# Patient Record
Sex: MALE | Race: WHITE | Employment: OTHER | URBAN - NONMETROPOLITAN AREA
[De-identification: names, ages, dates, MRNs, and addresses within clinical notes are randomized per-mention and may not be internally consistent; named-entity substitution may affect disease eponyms.]

---

## 2023-01-14 ENCOUNTER — HOSPITAL ENCOUNTER (INPATIENT)
Age: 68
LOS: 4 days | Discharge: NURSING, REHAB OR LONG-TERM CARE | DRG: 896 | End: 2023-01-18
Attending: EMERGENCY MEDICINE | Admitting: INTERNAL MEDICINE
Payer: MEDICARE

## 2023-01-14 ENCOUNTER — APPOINTMENT (OUTPATIENT)
Dept: GENERAL RADIOLOGY | Age: 68
DRG: 896 | End: 2023-01-14
Payer: MEDICARE

## 2023-01-14 DIAGNOSIS — E46 PROTEIN-CALORIE MALNUTRITION, UNSPECIFIED SEVERITY (HCC): ICD-10-CM

## 2023-01-14 DIAGNOSIS — F10.10 ALCOHOL ABUSE: ICD-10-CM

## 2023-01-14 DIAGNOSIS — E87.20 METABOLIC ACIDOSIS: ICD-10-CM

## 2023-01-14 DIAGNOSIS — R62.7 FAILURE TO THRIVE IN ADULT: Primary | ICD-10-CM

## 2023-01-14 DIAGNOSIS — E87.1 HYPONATREMIA: ICD-10-CM

## 2023-01-14 LAB
ALBUMIN SERPL-MCNC: 3.5 G/DL (ref 3.5–5.1)
ALP BLD-CCNC: 154 U/L (ref 38–126)
ALT SERPL-CCNC: 88 U/L (ref 11–66)
AMMONIA: 17 UMOL/L (ref 11–60)
ANION GAP SERPL CALCULATED.3IONS-SCNC: 19 MEQ/L (ref 8–16)
APTT: 27.5 SECONDS (ref 22–38)
AST SERPL-CCNC: 93 U/L (ref 5–40)
BASOPHILS # BLD: 0.9 %
BASOPHILS ABSOLUTE: 0.1 THOU/MM3 (ref 0–0.1)
BILIRUB SERPL-MCNC: 0.4 MG/DL (ref 0.3–1.2)
BILIRUBIN DIRECT: < 0.2 MG/DL (ref 0–0.3)
BUN BLDV-MCNC: 25 MG/DL (ref 7–22)
CALCIUM SERPL-MCNC: 9.1 MG/DL (ref 8.5–10.5)
CHLORIDE BLD-SCNC: 93 MEQ/L (ref 98–111)
CHP ED QC CHECK: 107
CO2: 18 MEQ/L (ref 23–33)
CREAT SERPL-MCNC: 0.9 MG/DL (ref 0.4–1.2)
EKG ATRIAL RATE: 89 BPM
EKG P AXIS: 80 DEGREES
EKG P-R INTERVAL: 132 MS
EKG Q-T INTERVAL: 380 MS
EKG QRS DURATION: 80 MS
EKG QTC CALCULATION (BAZETT): 462 MS
EKG R AXIS: 62 DEGREES
EKG T AXIS: 76 DEGREES
EKG VENTRICULAR RATE: 89 BPM
EOSINOPHIL # BLD: 0.3 %
EOSINOPHILS ABSOLUTE: 0 THOU/MM3 (ref 0–0.4)
ERYTHROCYTE [DISTWIDTH] IN BLOOD BY AUTOMATED COUNT: 12.9 % (ref 11.5–14.5)
ERYTHROCYTE [DISTWIDTH] IN BLOOD BY AUTOMATED COUNT: 40.8 FL (ref 35–45)
ETHYL ALCOHOL, SERUM: < 0.01 %
GFR SERPL CREATININE-BSD FRML MDRD: > 60 ML/MIN/1.73M2
GLUCOSE BLD-MCNC: 107 MG/DL (ref 70–108)
GLUCOSE BLD-MCNC: 108 MG/DL (ref 70–108)
HCT VFR BLD CALC: 37.8 % (ref 42–52)
HEMOGLOBIN: 13.2 GM/DL (ref 14–18)
IMMATURE GRANS (ABS): 0.04 THOU/MM3 (ref 0–0.07)
IMMATURE GRANULOCYTES: 0.5 %
INFLUENZA A: NOT DETECTED
INFLUENZA B: NOT DETECTED
INR BLD: 0.97 (ref 0.85–1.13)
LACTIC ACID: 2.1 MMOL/L (ref 0.5–2)
LIPASE: 22.3 U/L (ref 5.6–51.3)
LYMPHOCYTES # BLD: 14.1 %
LYMPHOCYTES ABSOLUTE: 1.1 THOU/MM3 (ref 1–4.8)
MAGNESIUM: 2.1 MG/DL (ref 1.6–2.4)
MCH RBC QN AUTO: 30.3 PG (ref 26–33)
MCHC RBC AUTO-ENTMCNC: 34.9 GM/DL (ref 32.2–35.5)
MCV RBC AUTO: 86.9 FL (ref 80–94)
MONOCYTES # BLD: 7.7 %
MONOCYTES ABSOLUTE: 0.6 THOU/MM3 (ref 0.4–1.3)
NUCLEATED RED BLOOD CELLS: 0 /100 WBC
OSMOLALITY CALCULATION: 265.7 MOSMOL/KG (ref 275–300)
PLATELET # BLD: 314 THOU/MM3 (ref 130–400)
PMV BLD AUTO: 9.6 FL (ref 9.4–12.4)
POTASSIUM SERPL-SCNC: 3.7 MEQ/L (ref 3.5–5.2)
PREALBUMIN: 9.1 MG/DL (ref 20–40)
RBC # BLD: 4.35 MILL/MM3 (ref 4.7–6.1)
REASON FOR REJECTION: NORMAL
REJECTED TEST: NORMAL
SARS-COV-2 RNA, RT PCR: NOT DETECTED
SEG NEUTROPHILS: 76.5 %
SEGMENTED NEUTROPHILS ABSOLUTE COUNT: 5.7 THOU/MM3 (ref 1.8–7.7)
SODIUM BLD-SCNC: 130 MEQ/L (ref 135–145)
TOTAL CK: 17 U/L (ref 55–170)
TOTAL PROTEIN: 6.6 G/DL (ref 6.1–8)
TROPONIN T: < 0.01 NG/ML
TSH SERPL DL<=0.05 MIU/L-ACNC: 2.18 UIU/ML (ref 0.4–4.2)
WBC # BLD: 7.5 THOU/MM3 (ref 4.8–10.8)

## 2023-01-14 PROCEDURE — 82948 REAGENT STRIP/BLOOD GLUCOSE: CPT

## 2023-01-14 PROCEDURE — 84134 ASSAY OF PREALBUMIN: CPT

## 2023-01-14 PROCEDURE — 84484 ASSAY OF TROPONIN QUANT: CPT

## 2023-01-14 PROCEDURE — 96374 THER/PROPH/DIAG INJ IV PUSH: CPT

## 2023-01-14 PROCEDURE — 87636 SARSCOV2 & INF A&B AMP PRB: CPT

## 2023-01-14 PROCEDURE — 93005 ELECTROCARDIOGRAM TRACING: CPT | Performed by: EMERGENCY MEDICINE

## 2023-01-14 PROCEDURE — 93010 ELECTROCARDIOGRAM REPORT: CPT | Performed by: NUCLEAR MEDICINE

## 2023-01-14 PROCEDURE — 82077 ASSAY SPEC XCP UR&BREATH IA: CPT

## 2023-01-14 PROCEDURE — 84443 ASSAY THYROID STIM HORMONE: CPT

## 2023-01-14 PROCEDURE — 83690 ASSAY OF LIPASE: CPT

## 2023-01-14 PROCEDURE — 83605 ASSAY OF LACTIC ACID: CPT

## 2023-01-14 PROCEDURE — 83735 ASSAY OF MAGNESIUM: CPT

## 2023-01-14 PROCEDURE — 6360000002 HC RX W HCPCS: Performed by: STUDENT IN AN ORGANIZED HEALTH CARE EDUCATION/TRAINING PROGRAM

## 2023-01-14 PROCEDURE — 82550 ASSAY OF CK (CPK): CPT

## 2023-01-14 PROCEDURE — 85025 COMPLETE CBC W/AUTO DIFF WBC: CPT

## 2023-01-14 PROCEDURE — 99223 1ST HOSP IP/OBS HIGH 75: CPT | Performed by: PHYSICIAN ASSISTANT

## 2023-01-14 PROCEDURE — 87040 BLOOD CULTURE FOR BACTERIA: CPT

## 2023-01-14 PROCEDURE — 80053 COMPREHEN METABOLIC PANEL: CPT

## 2023-01-14 PROCEDURE — 99285 EMERGENCY DEPT VISIT HI MDM: CPT

## 2023-01-14 PROCEDURE — 36415 COLL VENOUS BLD VENIPUNCTURE: CPT

## 2023-01-14 PROCEDURE — 82248 BILIRUBIN DIRECT: CPT

## 2023-01-14 PROCEDURE — 82140 ASSAY OF AMMONIA: CPT

## 2023-01-14 PROCEDURE — 85730 THROMBOPLASTIN TIME PARTIAL: CPT

## 2023-01-14 PROCEDURE — 1200000003 HC TELEMETRY R&B

## 2023-01-14 PROCEDURE — 85610 PROTHROMBIN TIME: CPT

## 2023-01-14 PROCEDURE — 2580000003 HC RX 258: Performed by: STUDENT IN AN ORGANIZED HEALTH CARE EDUCATION/TRAINING PROGRAM

## 2023-01-14 PROCEDURE — 71045 X-RAY EXAM CHEST 1 VIEW: CPT

## 2023-01-14 RX ORDER — ONDANSETRON 4 MG/1
4 TABLET, ORALLY DISINTEGRATING ORAL EVERY 8 HOURS PRN
Status: DISCONTINUED | OUTPATIENT
Start: 2023-01-14 | End: 2023-01-18 | Stop reason: HOSPADM

## 2023-01-14 RX ORDER — THIAMINE HYDROCHLORIDE 100 MG/ML
100 INJECTION, SOLUTION INTRAMUSCULAR; INTRAVENOUS DAILY
Status: DISCONTINUED | OUTPATIENT
Start: 2023-01-14 | End: 2023-01-18 | Stop reason: HOSPADM

## 2023-01-14 RX ORDER — PHENOBARBITAL 32.4 MG/1
64.8 TABLET ORAL 2 TIMES DAILY
Status: DISCONTINUED | OUTPATIENT
Start: 2023-01-15 | End: 2023-01-15

## 2023-01-14 RX ORDER — ACETAMINOPHEN 650 MG/1
650 SUPPOSITORY RECTAL EVERY 6 HOURS PRN
Status: DISCONTINUED | OUTPATIENT
Start: 2023-01-14 | End: 2023-01-18 | Stop reason: HOSPADM

## 2023-01-14 RX ORDER — ACETAMINOPHEN 325 MG/1
650 TABLET ORAL EVERY 6 HOURS PRN
Status: DISCONTINUED | OUTPATIENT
Start: 2023-01-14 | End: 2023-01-18 | Stop reason: HOSPADM

## 2023-01-14 RX ORDER — SODIUM CHLORIDE 0.9 % (FLUSH) 0.9 %
5-40 SYRINGE (ML) INJECTION PRN
Status: DISCONTINUED | OUTPATIENT
Start: 2023-01-14 | End: 2023-01-18 | Stop reason: HOSPADM

## 2023-01-14 RX ORDER — ONDANSETRON 2 MG/ML
4 INJECTION INTRAMUSCULAR; INTRAVENOUS EVERY 6 HOURS PRN
Status: DISCONTINUED | OUTPATIENT
Start: 2023-01-14 | End: 2023-01-18 | Stop reason: HOSPADM

## 2023-01-14 RX ORDER — POTASSIUM CHLORIDE 7.45 MG/ML
10 INJECTION INTRAVENOUS PRN
Status: DISCONTINUED | OUTPATIENT
Start: 2023-01-14 | End: 2023-01-18 | Stop reason: HOSPADM

## 2023-01-14 RX ORDER — ENOXAPARIN SODIUM 100 MG/ML
40 INJECTION SUBCUTANEOUS DAILY
Status: DISCONTINUED | OUTPATIENT
Start: 2023-01-15 | End: 2023-01-15

## 2023-01-14 RX ORDER — MAGNESIUM SULFATE IN WATER 40 MG/ML
2000 INJECTION, SOLUTION INTRAVENOUS PRN
Status: DISCONTINUED | OUTPATIENT
Start: 2023-01-14 | End: 2023-01-18 | Stop reason: HOSPADM

## 2023-01-14 RX ORDER — POTASSIUM CHLORIDE 20 MEQ/1
40 TABLET, EXTENDED RELEASE ORAL PRN
Status: DISCONTINUED | OUTPATIENT
Start: 2023-01-14 | End: 2023-01-18 | Stop reason: HOSPADM

## 2023-01-14 RX ORDER — SODIUM CHLORIDE 9 MG/ML
INJECTION, SOLUTION INTRAVENOUS PRN
Status: DISCONTINUED | OUTPATIENT
Start: 2023-01-14 | End: 2023-01-18 | Stop reason: HOSPADM

## 2023-01-14 RX ORDER — PHENOBARBITAL 32.4 MG/1
32.4 TABLET ORAL 2 TIMES DAILY
Status: DISCONTINUED | OUTPATIENT
Start: 2023-01-16 | End: 2023-01-15

## 2023-01-14 RX ORDER — POLYETHYLENE GLYCOL 3350 17 G/17G
17 POWDER, FOR SOLUTION ORAL DAILY PRN
Status: DISCONTINUED | OUTPATIENT
Start: 2023-01-14 | End: 2023-01-18 | Stop reason: HOSPADM

## 2023-01-14 RX ORDER — SODIUM CHLORIDE 0.9 % (FLUSH) 0.9 %
5-40 SYRINGE (ML) INJECTION EVERY 12 HOURS SCHEDULED
Status: DISCONTINUED | OUTPATIENT
Start: 2023-01-14 | End: 2023-01-18 | Stop reason: HOSPADM

## 2023-01-14 RX ORDER — DEXTROSE AND SODIUM CHLORIDE 5; .9 G/100ML; G/100ML
INJECTION, SOLUTION INTRAVENOUS CONTINUOUS
Status: DISCONTINUED | OUTPATIENT
Start: 2023-01-14 | End: 2023-01-15

## 2023-01-14 RX ADMIN — THIAMINE HYDROCHLORIDE 100 MG: 100 INJECTION, SOLUTION INTRAMUSCULAR; INTRAVENOUS at 16:13

## 2023-01-14 RX ADMIN — DEXTROSE AND SODIUM CHLORIDE: 5; 900 INJECTION, SOLUTION INTRAVENOUS at 16:16

## 2023-01-14 ASSESSMENT — ENCOUNTER SYMPTOMS
ALLERGIC/IMMUNOLOGIC NEGATIVE: 1
RESPIRATORY NEGATIVE: 1
GASTROINTESTINAL NEGATIVE: 1
EYES NEGATIVE: 1

## 2023-01-14 NOTE — ED PROVIDER NOTES
PATIENT NAME: Makenzie Carlin  MRN: [id number]  : 1955  [place]: 2023    I performed a history and physical examination of the patient and discussed management with the Resident. I reviewed the Resident's note and agree with the documented findings and plan of care. Any areas of disagreement are noted on the chart. I was personally present for the key portions of any procedures and have documented in the chart those procedures where I was not present during the key portions. I have reviewed the emergency nurses triage note and agree with the chief complaint, past medical history, past surgical history, allergies, medications, social and family history as documented unless otherwise noted below. MEDICAL DEDISION MAKING (MDM)     Makenzie Carlin is a 74 y.o. male who presents to Emergency Department with Fatigue     Patient is brought in by EMS for evaluation of alcohol abuse, failure to thrive, poor oral intake, and significant weight loss. Patient is confused, history is obtained from his girlfriend who has been with him for the last year. According to his girlfriend, patient is alcoholic, drinking beer for years, patient has been losing weight over last 6-8 weeks. He only eats 2 or 3 bites every day. On arrival, patient was alert, but was slow in answering questions. He has no fever, no chills. He states he is not in pain. He has noticeable petechia from bilateral lower extremities. Physical exam: AVSS. Patient is skinny, ill-appearing, malnourished. Lungs are clear. Heart shows regular rhythmic rate, S1-S2. Abdomen soft. Extremities show no edema. Diffuse petechia from bilateral lower extremities. EKG has no acute changes. ED work-ups reveal severe malnutrition, metabolic acidosis, metabolic acidosis  and hyponatremia. Admission is warranted. Discussed and admitted to hospitalist service.     Vitals:    23 1449 23 1615 23 1728   BP: 121/85 (!) 128/96 121/74 Pulse: 94 93 90   Resp: 18 20 19   Temp: 97.5 °F (36.4 °C) 97.7 °F (36.5 °C) 98.2 °F (36.8 °C)   TempSrc: Oral Oral Oral   SpO2: 99% 99% 99%   Weight: 148 lb (67.1 kg)     Height: 5' 7\" (1.702 m)       Medications   thiamine (B-1) injection 100 mg (100 mg IntraVENous Given 1/14/23 1613)   dextrose 5 % and 0.9 % sodium chloride infusion ( IntraVENous New Bag 1/14/23 1616)     Labs Reviewed   CBC WITH AUTO DIFFERENTIAL - Abnormal; Notable for the following components:       Result Value    RBC 4.35 (*)     Hemoglobin 13.2 (*)     Hematocrit 37.8 (*)     All other components within normal limits   LACTIC ACID - Abnormal; Notable for the following components:    Lactic Acid 2.1 (*)     All other components within normal limits   PREALBUMIN - Abnormal; Notable for the following components:    Prealbumin 9.1 (*)     All other components within normal limits   CK - Abnormal; Notable for the following components: Total CK 17 (*)     All other components within normal limits   COMPREHENSIVE METABOLIC PANEL - Abnormal; Notable for the following components:    BUN 25 (*)     Sodium 130 (*)     Chloride 93 (*)     CO2 18 (*)     AST 93 (*)     Alkaline Phosphatase 154 (*)     ALT 88 (*)     All other components within normal limits   ANION GAP - Abnormal; Notable for the following components:    Anion Gap 19.0 (*)     All other components within normal limits   OSMOLALITY - Abnormal; Notable for the following components:    Osmolality Calc 265.7 (*)     All other components within normal limits   POCT GLUCOSE - Normal   COVID-19 & INFLUENZA COMBO   CULTURE, BLOOD 1   CULTURE, BLOOD 2   ETHANOL   BILIRUBIN, DIRECT   LIPASE   APTT   PROTIME-INR   MAGNESIUM   SPECIMEN REJECTION   AMMONIA   TROPONIN   TSH WITH REFLEX   GLOMERULAR FILTRATION RATE, ESTIMATED   URINE DRUG SCREEN   URINALYSIS   POCT GLUCOSE     XR CHEST PORTABLE   Final Result   1. Emphysematous changes predominantly within the upper lung zones are seen.  No other acute cardiopulmonary findings are otherwise demonstrated. **This report has been created using voice recognition software. It may contain minor errors which are inherent in voice recognition technology. **      Final report electronically signed by Dr. Savita Garcia on 1/14/2023 4:00 PM          FINAL IMPRESSION AND DISPOSITION      1. Failure to thrive in adult    2. Protein-calorie malnutrition, unspecified severity ([address]    3. Alcohol abuse    4. Metabolic acidosis    5. Hyponatremia        DISPOSITION Decision To Admit 01/14/2023 06:28:33 PM      PATIENT REFERRED TO:  No follow-up provider specified.     DISCHARGE MEDICATIONS:  New Prescriptions    No medications on file       (Please note that portions of this note were completed with a voice recognition program.  Efforts were made to edit the dictations but occasionally words aremis-transcribed.)    MD Aron Kelly MD  01/14/23 4810

## 2023-01-14 NOTE — ED TRIAGE NOTES
Pt arrives to from home with c/o increased fatigue and loss of appetite. EMT's state pt's friends called the squad with concern he has not eaten anything in a week, has been able to drink some water. Pt arrives oriented to his name and birthday, but is not oriented to the place, date, or time. Pt states he lives alone in [facility], has friends who check in on him regularly. On arrival pt states he has not had an appetite in a few days, does drink about 12 cans of beer a day. Pt denies any drug use. Pt is calm and cooperative on arrival, VSS.

## 2023-01-14 NOTE — ED PROVIDER NOTES
[facility]      EMERGENCY MEDICINE     Pt Name: Betty Gonzales  MRN: [id number]  [place] 1955  Date of evaluation: 1/14/2023  Provider: Alba Lynch MD    CHIEF COMPLAINT       Chief Complaint   Patient presents with    Fatigue       HISTORY OF PRESENT ILLNESS   Betty Gonzales is a pleasant 74 y.o. male who presents to the emergency department from from home, brought in by EMS for evaluation of weakness and loss of appetite. Patient admits to past medical history of COPD and alcohol abuse. Patient presents to the emergency department with confusion, fatigue and loss of appetite. He states he was brought in by his friends and has not had anything to eat \"for a while, and feeling really weak. \"  He admits to alcohol abuse drinking 78-94 alcoholic beverages daily, last alcoholic beverage was 3-4 days ago. Patient admits to prior hospitalization for alcohol withdrawals, currently denies symptoms of alcohol withdrawal.  Denies any symptoms of pain, shortness of breath, tremors, palpitations, headaches, anxiety, GI upset, headaches, diaphoresis. His friend Livan Zaidi and girlfriend Holly Malagon also present reporting changes in behavior, more aggressive, increased sleep, reporting he has not had a meal in at least 2 weeks only eating 2 or 3 bites per meal and losing weight over the last 6-8 weeks. Holly Malagon states she will cook him meals on a regular basis but he refuses to eat. Over the last couple days he has also stopped drinking alcohol due to poor appetite. Parent also states he has refused to go to the doctor in the past.    Admits to significant alcohol abuse: 07-18 tall alcoholic beverages daily. Daily smoker   Admits to marijuana use, declines other illicit substances      PASTMEDICAL HISTORY     History reviewed. No pertinent past medical history. Patient Active Problem List   Diagnosis Code    Syncope R55    Hyponatremia E87.1     SURGICAL HISTORY       History reviewed.  No pertinent surgical history. CURRENT MEDICATIONS       There are no discharge medications for this patient. ALLERGIES     has No Known Allergies. FAMILY HISTORY     has no family status information on file. SOCIAL HISTORY       Social History     Tobacco Use    Smoking status: Every Day     Packs/day: 0.50     Years: 40.00     Pack years: 20.00     Types: Cigarettes   Substance Use Topics    Alcohol use: Yes     Alcohol/week: 20.0 standard drinks     Types: 20 Cans of beer per week     Comment: social    Drug use: No       PHYSICAL EXAM       ED Triage Vitals [01/12/23 0956]   BP Temp Temp Source Heart Rate Resp SpO2 Height Weight   (!) 167/85 97.4 °F (36.3 °C) Oral 66 22 100 % 5' 11\" (1.803 m) 190 lb (86.2 kg)       Additional Vital Signs:  Vitals:    01/15/23 1144   BP: 92/67   Pulse: 75   Resp:    Temp: 97.5 °F (36.4 °C)   SpO2: 100%     Physical Exam  General appearance: Unkempt 62-year-old male. Alert but slow to answer questions. No apparent distress, cooperative. HEENT: Nystagmus. Pupils equal, round, and reactive to light. Conjunctivae/corneas clear. Neck: Supple, with full range of motion. No jugular venous distention. Trachea midline. Respiratory: Diffuse right-sided rhonchi and coarse breath sounds appreciated on auscultation. Left sided breath sounds clear to auscultation,   cardiovascular: Regular rate and rhythm with normal S1/S2 without murmurs, rubs or gallops. Abdomen: Soft, non-tender, non-distended with normal bowel sounds. Musculoskeletal: passive and active ROM x 4 extremities. Skin: Diffuse petechiae throughout his body, most notable on bilateral lower extremities. Neurologic:  Neurovascularly intact without any focal sensory/motor deficits. Cranial nerves: II-XII intact, grossly non-focal.  Psychiatric: Alert, slow to answer questions.   Capillary Refill: Brisk,< 3 seconds   Peripheral Pulses: +2 palpable, equal bilaterally     FORMAL DIAGNOSTIC RESULTS     RADIOLOGY: Interpretation per the Radiologist below, if available at the time of this note (none if blank):    XR CHEST PORTABLE   Final Result   1. Emphysematous changes predominantly within the upper lung zones are seen. No other acute cardiopulmonary findings are otherwise demonstrated. **This report has been created using voice recognition software. It may contain minor errors which are inherent in voice recognition technology. **      Final report electronically signed by Dr. Luly Argueta on 1/14/2023 4:00 PM      US ABDOMEN COMPLETE    (Results Pending)       LABS: (none if blank)  Labs Reviewed   CBC WITH AUTO DIFFERENTIAL - Abnormal; Notable for the following components:       Result Value    RBC 4.35 (*)     Hemoglobin 13.2 (*)     Hematocrit 37.8 (*)     All other components within normal limits   LACTIC ACID - Abnormal; Notable for the following components:    Lactic Acid 2.1 (*)     All other components within normal limits   PREALBUMIN - Abnormal; Notable for the following components:    Prealbumin 9.1 (*)     All other components within normal limits   CK - Abnormal; Notable for the following components:     Total CK 17 (*)     All other components within normal limits   COMPREHENSIVE METABOLIC PANEL - Abnormal; Notable for the following components:    BUN 25 (*)     Sodium 130 (*)     Chloride 93 (*)     CO2 18 (*)     AST 93 (*)     Alkaline Phosphatase 154 (*)     ALT 88 (*)     All other components within normal limits   ANION GAP - Abnormal; Notable for the following components:    Anion Gap 19.0 (*)     All other components within normal limits   OSMOLALITY - Abnormal; Notable for the following components:    Osmolality Calc 265.7 (*)     All other components within normal limits   BASIC METABOLIC PANEL W/ REFLEX TO MG FOR LOW K - Abnormal; Notable for the following components:    CO2 22 (*)     Glucose 120 (*)     All other components within normal limits   CBC WITH AUTO DIFFERENTIAL - Abnormal; Notable for the following components:    RBC 3.41 (*)     Hemoglobin 10.5 (*)     Hematocrit 30.9 (*)     All other components within normal limits   VITAMIN B12 & FOLATE - Abnormal; Notable for the following components:    Vitamin B-12 1553 (*)     All other components within normal limits   IRON BINDING CAPACITY - Abnormal; Notable for the following components:    TIBC 154 (*)     All other components within normal limits   IRON - Abnormal; Notable for the following components:    Iron 32 (*)     All other components within normal limits   FERRITIN - Abnormal; Notable for the following components:    Ferritin 530 (*)     All other components within normal limits   POCT GLUCOSE - Normal   COVID-19 & INFLUENZA COMBO   CULTURE, BLOOD 1   CULTURE, BLOOD 2   ETHANOL   BILIRUBIN, DIRECT   LIPASE   APTT   PROTIME-INR   MAGNESIUM   SPECIMEN REJECTION   AMMONIA   TROPONIN   TSH WITH REFLEX   GLOMERULAR FILTRATION RATE, ESTIMATED   LACTIC ACID   IRON SATURATION   LIPID PANEL   HEPATITIS PANEL, ACUTE   ANION GAP   GLOMERULAR FILTRATION RATE, ESTIMATED   URINE DRUG SCREEN   URINALYSIS   VITAMIN B1   POCT GLUCOSE       (Any cultures that may have been sent were not resulted at the time of this patient visit)    [facility] / ED COURSE:     1) Number and Complexity of Problems            Problem List This Visit:         Chief Complaint   Patient presents with    Fatigue              Differential Diagnosis includes (but not limited to): Failure to thrive, malnutrition, alcohol withdrawal, sepsis, pneumonia, hypoglycemia         2)  Data Reviewed (none if left blank)          My Independent interpretations:     EKG: Thiamine, folate, B12 normal sinus rhythm, ventricular rate 89, AR interval 132, QRS 80, , QTc 462    Imaging: Chest x-ray: Emphysematous changes, no effusion or opacities visualized. Labs:      Severe malnutrition with metabolic acidosis and hyponatremia.                  Decision Rules/Clinical Scores utilized:   NIH of 1: Missing the month          External Documentation Reviewed:         Previous patient encounter documents & history available on EMR was reviewed. See Formal Diagnostic Results above for the lab and radiology tests and orders. 3)  Treatment and Disposition          Shared Decision-Making was performed and disposition discussed with the        Patient/Family and questions answered. Social determinants of health impacting treatment or disposition: Alone, chronic alcoholic           Summary of Patient Presentation:    MDM  Patient presents to the emergency department via EMS called in by friends due to poor nutrition and alcohol abuse with pain appetite and weakness. Patient admits to drinking 37-70 alcoholic beverages daily and decreased appetite. Point-of-care glucose 107, lab work obtained, started patient on D5 normal saline and thiamine. Noticeable petechiae throughout his body most notable in bilateral lower extremities. Recommend admission to the inpatient service due to failure to thrive and malnutrition given patient's syndrome of weight loss, decreased appetite and poor nutrition and associated with inactivity. Patient admitted. Vitals Reviewed:    Vitals:    01/15/23 0026 01/15/23 0331 01/15/23 0812 01/15/23 1144   BP: 113/75 102/76 106/67 92/67   Pulse: 72 82 99 75   Resp: 16 16 16    Temp: 97.5 °F (36.4 °C) 98.4 °F (36.9 °C) 97 °F (36.1 °C) 97.5 °F (36.4 °C)   TempSrc: Oral Oral Oral Oral   SpO2: 99% 96% 98% 100%   Weight:       Height:           The patient was seen and examined. Appropriate diagnostic testing was performed and results reviewed with the patient. The results of pertinent diagnostic studies and exam findings were discussed. The patients provisional diagnosis and plan of care were discussed with the patient and present family who expressed understanding.  Any medications were reviewed and indications and risks of medications were discussed with the patient /family present. Strict verbal and written return precautions, instructions and appropriate follow-up provided to the patient.      ED Medications administered this visit:  (None if blank)  Medications   thiamine (B-1) injection 100 mg (100 mg IntraVENous Given 1/15/23 0833)   sodium chloride flush 0.9 % injection 5-40 mL (5 mLs IntraVENous Not Given 1/15/23 0832)   sodium chloride flush 0.9 % injection 5-40 mL (has no administration in time range)   0.9 % sodium chloride infusion (has no administration in time range)   ondansetron (ZOFRAN-ODT) disintegrating tablet 4 mg (has no administration in time range)     Or   ondansetron (ZOFRAN) injection 4 mg (has no administration in time range)   polyethylene glycol (GLYCOLAX) packet 17 g (has no administration in time range)   acetaminophen (TYLENOL) tablet 650 mg (has no administration in time range)     Or   acetaminophen (TYLENOL) suppository 650 mg (has no administration in time range)   potassium chloride (KLOR-CON M) extended release tablet 40 mEq (has no administration in time range)     Or   potassium bicarb-citric acid (EFFER-K) effervescent tablet 40 mEq (has no administration in time range)     Or   potassium chloride 10 mEq/100 mL IVPB (Peripheral Line) (has no administration in time range)   magnesium sulfate 2000 mg in 50 mL IVPB premix (has no administration in time range)   PHENobarbital (LUMINAL) 193.7 mg in sodium chloride 0.9 % 100 mL IVPB (0 mg IntraVENous Stopped 1/15/23 1048)     Followed by   PHENobarbital (LUMINAL) tablet 64.8 mg (has no administration in time range)     Followed by   PHENobarbital (LUMINAL) tablet 32.4 mg (has no administration in time range)   enoxaparin Sodium (LOVENOX) injection 30 mg (30 mg SubCUTAneous Given 1/15/23 0833)         PROCEDURES: (None if blank)  Procedures:     CRITICAL CARE: (None if blank)      DISCHARGE PRESCRIPTIONS: (None if blank)  There are no discharge medications for this patient. FINAL IMPRESSION      1. Failure to thrive in adult    2. Protein-calorie malnutrition, unspecified severity ([facility] [address]    3. Alcohol abuse    4. Metabolic acidosis    5. Hyponatremia          DISPOSITION/PLAN   DISPOSITION Admitted 01/14/2023 07:30:32 PM      OUTPATIENT FOLLOW UP THE PATIENT:  No follow-up provider specified.     MD Karen Sherman MD  Resident  01/15/23 0702

## 2023-01-14 NOTE — ED NOTES
Patient resting in bed. Respirations easy and unlabored. No distress noted. Call light within reach. Family at bedside  No new complaints at this time, fluids continue.       Liz Figueroa RN  01/14/23 1678

## 2023-01-15 ENCOUNTER — APPOINTMENT (OUTPATIENT)
Dept: ULTRASOUND IMAGING | Age: 68
DRG: 896 | End: 2023-01-15
Payer: MEDICARE

## 2023-01-15 LAB
ANION GAP SERPL CALCULATED.3IONS-SCNC: 13 MEQ/L (ref 8–16)
BASOPHILS # BLD: 0.9 %
BASOPHILS ABSOLUTE: 0.1 THOU/MM3 (ref 0–0.1)
BUN BLDV-MCNC: 19 MG/DL (ref 7–22)
CALCIUM SERPL-MCNC: 8.5 MG/DL (ref 8.5–10.5)
CHLORIDE BLD-SCNC: 102 MEQ/L (ref 98–111)
CHOLESTEROL, TOTAL: 165 MG/DL (ref 100–199)
CO2: 22 MEQ/L (ref 23–33)
CREAT SERPL-MCNC: 0.8 MG/DL (ref 0.4–1.2)
EOSINOPHIL # BLD: 1.5 %
EOSINOPHILS ABSOLUTE: 0.1 THOU/MM3 (ref 0–0.4)
ERYTHROCYTE [DISTWIDTH] IN BLOOD BY AUTOMATED COUNT: 13.1 % (ref 11.5–14.5)
ERYTHROCYTE [DISTWIDTH] IN BLOOD BY AUTOMATED COUNT: 42.6 FL (ref 35–45)
FERRITIN: 530 NG/ML (ref 22–322)
FOLATE: 10 NG/ML (ref 4.8–24.2)
GFR SERPL CREATININE-BSD FRML MDRD: > 60 ML/MIN/1.73M2
GLUCOSE BLD-MCNC: 120 MG/DL (ref 70–108)
HAV IGM SER IA-ACNC: NEGATIVE
HCT VFR BLD CALC: 30.9 % (ref 42–52)
HDLC SERPL-MCNC: 44 MG/DL
HEMOGLOBIN: 10.5 GM/DL (ref 14–18)
HEPATITIS B CORE IGM ANTIBODY: NEGATIVE
HEPATITIS B SURFACE ANTIGEN: NEGATIVE
HEPATITIS C ANTIBODY: NEGATIVE
IMMATURE GRANS (ABS): 0.04 THOU/MM3 (ref 0–0.07)
IMMATURE GRANULOCYTES: 0.6 %
IRON SATURATION: 21 % (ref 20–50)
IRON: 32 UG/DL (ref 65–195)
LACTIC ACID: 1.3 MMOL/L (ref 0.5–2)
LDL CHOLESTEROL CALCULATED: 108 MG/DL
LYMPHOCYTES # BLD: 18.3 %
LYMPHOCYTES ABSOLUTE: 1.2 THOU/MM3 (ref 1–4.8)
MCH RBC QN AUTO: 30.8 PG (ref 26–33)
MCHC RBC AUTO-ENTMCNC: 34 GM/DL (ref 32.2–35.5)
MCV RBC AUTO: 90.6 FL (ref 80–94)
MONOCYTES # BLD: 8.9 %
MONOCYTES ABSOLUTE: 0.6 THOU/MM3 (ref 0.4–1.3)
NUCLEATED RED BLOOD CELLS: 0 /100 WBC
PLATELET # BLD: 226 THOU/MM3 (ref 130–400)
PMV BLD AUTO: 10 FL (ref 9.4–12.4)
POTASSIUM REFLEX MAGNESIUM: 3.6 MEQ/L (ref 3.5–5.2)
RBC # BLD: 3.41 MILL/MM3 (ref 4.7–6.1)
SEG NEUTROPHILS: 69.8 %
SEGMENTED NEUTROPHILS ABSOLUTE COUNT: 4.5 THOU/MM3 (ref 1.8–7.7)
SODIUM BLD-SCNC: 137 MEQ/L (ref 135–145)
TOTAL IRON BINDING CAPACITY: 154 UG/DL (ref 171–450)
TRIGL SERPL-MCNC: 67 MG/DL (ref 0–199)
VITAMIN B-12: 1553 PG/ML (ref 211–911)
WBC # BLD: 6.5 THOU/MM3 (ref 4.8–10.8)

## 2023-01-15 PROCEDURE — 80074 ACUTE HEPATITIS PANEL: CPT

## 2023-01-15 PROCEDURE — 99233 SBSQ HOSP IP/OBS HIGH 50: CPT | Performed by: PHYSICIAN ASSISTANT

## 2023-01-15 PROCEDURE — 84425 ASSAY OF VITAMIN B-1: CPT

## 2023-01-15 PROCEDURE — 80048 BASIC METABOLIC PNL TOTAL CA: CPT

## 2023-01-15 PROCEDURE — 1200000003 HC TELEMETRY R&B

## 2023-01-15 PROCEDURE — 2580000003 HC RX 258: Performed by: PHYSICIAN ASSISTANT

## 2023-01-15 PROCEDURE — 6360000002 HC RX W HCPCS: Performed by: PHYSICIAN ASSISTANT

## 2023-01-15 PROCEDURE — 83540 ASSAY OF IRON: CPT

## 2023-01-15 PROCEDURE — 82746 ASSAY OF FOLIC ACID SERUM: CPT

## 2023-01-15 PROCEDURE — 6360000002 HC RX W HCPCS: Performed by: STUDENT IN AN ORGANIZED HEALTH CARE EDUCATION/TRAINING PROGRAM

## 2023-01-15 PROCEDURE — 36415 COLL VENOUS BLD VENIPUNCTURE: CPT

## 2023-01-15 PROCEDURE — 83605 ASSAY OF LACTIC ACID: CPT

## 2023-01-15 PROCEDURE — 83550 IRON BINDING TEST: CPT

## 2023-01-15 PROCEDURE — 85025 COMPLETE CBC W/AUTO DIFF WBC: CPT

## 2023-01-15 PROCEDURE — 80061 LIPID PANEL: CPT

## 2023-01-15 PROCEDURE — 6370000000 HC RX 637 (ALT 250 FOR IP): Performed by: PHYSICIAN ASSISTANT

## 2023-01-15 PROCEDURE — 82728 ASSAY OF FERRITIN: CPT

## 2023-01-15 PROCEDURE — 76700 US EXAM ABDOM COMPLETE: CPT

## 2023-01-15 PROCEDURE — 82607 VITAMIN B-12: CPT

## 2023-01-15 RX ORDER — PHENOBARBITAL 32.4 MG/1
32.4 TABLET ORAL 2 TIMES DAILY
Status: ACTIVE | OUTPATIENT
Start: 2023-01-16 | End: 2023-01-17

## 2023-01-15 RX ORDER — PHENOBARBITAL 32.4 MG/1
64.8 TABLET ORAL 2 TIMES DAILY
Status: DISPENSED | OUTPATIENT
Start: 2023-01-15 | End: 2023-01-16

## 2023-01-15 RX ORDER — ENOXAPARIN SODIUM 100 MG/ML
30 INJECTION SUBCUTANEOUS DAILY
Status: DISCONTINUED | OUTPATIENT
Start: 2023-01-15 | End: 2023-01-18 | Stop reason: HOSPADM

## 2023-01-15 RX ADMIN — DEXTROSE AND SODIUM CHLORIDE: 5; 900 INJECTION, SOLUTION INTRAVENOUS at 05:47

## 2023-01-15 RX ADMIN — THIAMINE HYDROCHLORIDE 100 MG: 100 INJECTION, SOLUTION INTRAMUSCULAR; INTRAVENOUS at 08:33

## 2023-01-15 RX ADMIN — PHENOBARBITAL 64.8 MG: 32.4 TABLET ORAL at 14:09

## 2023-01-15 RX ADMIN — ENOXAPARIN SODIUM 30 MG: 100 INJECTION SUBCUTANEOUS at 08:33

## 2023-01-15 RX ADMIN — SODIUM CHLORIDE, PRESERVATIVE FREE 10 ML: 5 INJECTION INTRAVENOUS at 23:57

## 2023-01-15 RX ADMIN — PHENOBARBITAL SODIUM 264.55 MG: 65 INJECTION INTRAMUSCULAR at 00:11

## 2023-01-15 RX ADMIN — PHENOBARBITAL SODIUM 193.7 MG: 65 INJECTION INTRAMUSCULAR at 08:00

## 2023-01-15 RX ADMIN — PHENOBARBITAL SODIUM 193.7 MG: 65 INJECTION INTRAMUSCULAR at 05:48

## 2023-01-15 NOTE — ED NOTES
ED to inpatient nurses report    Chief Complaint   Patient presents with    Fatigue      Present to ED from home  LOC: Name,   Vital signs   Vitals:    23 1449 23 1615 23 1728 23   BP: 121/85 (!) 128/96 121/74 113/76   Pulse: 94 93 90 89   Resp: 18  19 15   Temp: 97.5 °F (36.4 °C) 97.7 °F (36.5 °C) 98.2 °F (36.8 °C) 97.8 °F (36.6 °C)   TempSrc: Oral Oral Oral Oral   SpO2: 99% 99% 99% 97%   Weight: 148 lb (67.1 kg)      Height: 5' 7\" (1.702 m)         Oxygen Baseline     Current needs required RA   LDAs:   Peripheral IV 23 Right Wrist (Active)   Site Assessment Clean, dry & intact;Dry;Clean; Intact 23   Line Status Blood return noted;Brisk blood return;Flushed 23     Mobility: Requires assistance * 2  Pending ED orders: none  Present condition: stable    C-SSRS    Swallow Screening    Preferred Language: English     Electronically signed by Rip Cash RN on 2023 at 7:43 PM\       Rip Cash RN  23

## 2023-01-15 NOTE — H&P
Hospitalist - History & Physical      Patient: Syl Chan    Unit/Bed:35/035A  YOB: 1955  MRN: [id number]   Acct: [de-identified]   PCP: No primary care provider on file. Assessment and Plan:        Failure to thrive in adult:   D5/NS IV infusion  CXR - Emphysematous changes predominantly within the upper lung zones. Regular diet with nutritional supplementation  Urinalysis, Vit. B12, Folate level, total cholesterol  Consider Pulmonology consult and PFT for COPD evaluation  Hyponatremia:   D5/NS IV infusion  Recheck BMP   Alcohol Abuse: Thiamine injection  EKG - NSR  Drug panel  [facility] protocol  ***: ***    CC:  Fatigue    HPI: Patient is a 74 y.o. male with a history of alcohol abuse who presents to the ED with complaints of increased fatigue and loss of appetite. Patient's friends reports he has not eaten anything in a week, only a couple bites everyday, has been able to drink some water, normally drinks alcohol daily (12+ beer/day), but hasn't in 3-4 days. He has been losing weight over the last 6-8 weeks. Patient is oriented to his name and birthday, but is not oriented to the place, date, or time. Patient not meaningfully able to contribute to this HPI. Patient denies any drug use. Patient is being admitted for further evaluation and workup of FTT.  ROS: Review of Systems   Constitutional:  Positive for activity change, appetite change, fatigue and unexpected weight change. Negative for chills, diaphoresis and fever. HENT: Negative. Eyes: Negative. Respiratory: Negative. Cardiovascular: Negative. Gastrointestinal: Negative. Endocrine: Negative. Genitourinary: Negative. Musculoskeletal: Negative. Allergic/Immunologic: Negative. Neurological: Negative. Hematological: Negative. Psychiatric/Behavioral:  Positive for confusion and decreased concentration. Negative for hallucinations, self-injury and suicidal ideas. PMH:  History reviewed.  No pertinent past medical history. SHX:    Social History     Socioeconomic History    Marital status:      Spouse name: Not on file    Number of children: Not on file    Years of education: Not on file    Highest education level: Not on file   Occupational History    Not on file   Tobacco Use    Smoking status: Every Day     Packs/day: 0.50     Years: 40.00     Pack years: 20.00     Types: Cigarettes    Smokeless tobacco: Not on file   Substance and Sexual Activity    Alcohol use: Yes     Alcohol/week: 20.0 standard drinks     Types: 20 Cans of beer per week     Comment: social    Drug use: No    Sexual activity: Yes     Partners: Female   Other Topics Concern    Not on file   Social History Narrative    Not on file     Social Determinants of Health     Financial Resource Strain: Not on file   Food Insecurity: Not on file   Transportation Needs: Not on file   Physical Activity: Not on file   Stress: Not on file   Social Connections: Not on file   Intimate Partner Violence: Not on file   Housing Stability: Not on file     FHX:   Family History   Problem Relation Age of Onset    Arthritis Mother     High Blood Pressure Mother     Cancer Father      Allergies: No Known Allergies  Medications:     dextrose 5 % and 0.9 % NaCl 100 mL/hr at 01/14/23 1616      thiamine  100 mg IntraVENous Daily        No current facility-administered medications on file prior to encounter. No current outpatient medications on file prior to encounter.          Labs:   Recent Results (from the past 24 hour(s))   POCT Glucose    Collection Time: 01/14/23  3:05 PM   Result Value Ref Range    POC Glucose 107 70 - 108 mg/dl   POCT Glucose    Collection Time: 01/14/23  3:06 PM   Result Value Ref Range    QC OK? 107    CBC with Auto Differential    Collection Time: 01/14/23  3:10 PM   Result Value Ref Range    WBC 7.5 4.8 - 10.8 thou/mm3    RBC 4.35 (L) 4.70 - 6.10 mill/mm3    Hemoglobin 13.2 (L) 14.0 - 18.0 gm/dl    Hematocrit 37.8 (L) 42.0 - 52.0 %    MCV 86.9 80.0 - 94.0 fL    MCH 30.3 26.0 - 33.0 pg    MCHC 34.9 32.2 - 35.5 gm/dl    RDW-CV 12.9 11.5 - 14.5 %    RDW-SD 40.8 35.0 - 45.0 fL    Platelets 143 970 - 483 thou/mm3    MPV 9.6 9.4 - 12.4 fL    Seg Neutrophils 76.5 %    Lymphocytes 14.1 %    Monocytes 7.7 %    Eosinophils 0.3 %    Basophils 0.9 %    Immature Granulocytes 0.5 %    Segs Absolute 5.7 1.8 - 7.7 thou/mm3    Lymphocytes Absolute 1.1 1.0 - 4.8 thou/mm3    Monocytes Absolute 0.6 0.4 - 1.3 thou/mm3    Eosinophils Absolute 0.0 0.0 - 0.4 thou/mm3    Basophils Absolute 0.1 0.0 - 0.1 thou/mm3    Immature Grans (Abs) 0.04 0.00 - 0.07 thou/mm3    nRBC 0 /100 wbc   Ethanol    Collection Time: 01/14/23  3:10 PM   Result Value Ref Range    ETHYL ALCOHOL, SERUM < 0.01 0.00 %   Lactic Acid    Collection Time: 01/14/23  3:10 PM   Result Value Ref Range    Lactic Acid 2.1 (H) 0.5 - 2.0 mmol/L   EKG 12 Lead    Collection Time: 01/14/23  3:40 PM   Result Value Ref Range    Ventricular Rate 89 BPM    Atrial Rate 89 BPM    P-R Interval 132 ms    QRS Duration 80 ms    Q-T Interval 380 ms    QTc Calculation (Bazett) 462 ms    P Axis 80 degrees    R Axis 62 degrees    T Axis 76 degrees   SPECIMEN REJECTION    Collection Time: 01/14/23  4:04 PM   Result Value Ref Range    Rejected Test chems     Reason for Rejection see below    COVID-19 & Influenza Combo    Collection Time: 01/14/23  4:15 PM    Specimen: Nasopharyngeal Swab   Result Value Ref Range    SARS-CoV-2 RNA, RT PCR NOT DETECTED NOT DETECTED    INFLUENZA A NOT DETECTED NOT DETECTED    INFLUENZA B NOT DETECTED NOT DETECTED   Prealbumin    Collection Time: 01/14/23  4:29 PM   Result Value Ref Range    Prealbumin 9.1 (L) 20.0 - 40.0 mg/dl   Bilirubin, Direct    Collection Time: 01/14/23  4:29 PM   Result Value Ref Range    Bilirubin, Direct <0.2 0.0 - 0.3 mg/dL   Lipase    Collection Time: 01/14/23  4:29 PM   Result Value Ref Range    Lipase 22.3 5.6 - 51.3 U/L   APTT    Collection Time: 01/14/23  4:29 PM   Result Value Ref Range    aPTT 27.5 22.0 - 38.0 seconds   Protime-INR    Collection Time: 01/14/23  4:29 PM   Result Value Ref Range    INR 0.97 0.85 - 1.13   Magnesium    Collection Time: 01/14/23  4:29 PM   Result Value Ref Range    Magnesium 2.1 1.6 - 2.4 mg/dL   CK    Collection Time: 01/14/23  4:29 PM   Result Value Ref Range    Total CK 17 (L) 55 - 170 U/L   Comprehensive Metabolic Panel    Collection Time: 01/14/23  4:29 PM   Result Value Ref Range    Glucose 108 70 - 108 mg/dL    Creatinine 0.9 0.4 - 1.2 mg/dL    BUN 25 (H) 7 - 22 mg/dL    Sodium 130 (L) 135 - 145 meq/L    Potassium 3.7 3.5 - 5.2 meq/L    Chloride 93 (L) 98 - 111 meq/L    CO2 18 (L) 23 - 33 meq/L    Calcium 9.1 8.5 - 10.5 mg/dL    AST 93 (H) 5 - 40 U/L    Alkaline Phosphatase 154 (H) 38 - 126 U/L    Total Protein 6.6 6.1 - 8.0 g/dL    Albumin 3.5 3.5 - 5.1 g/dL    Total Bilirubin 0.4 0.3 - 1.2 mg/dL    ALT 88 (H) 11 - 66 U/L   Troponin    Collection Time: 01/14/23  4:29 PM   Result Value Ref Range    Troponin T < 0.010 ng/ml   TSH with Reflex    Collection Time: 01/14/23  4:29 PM   Result Value Ref Range    TSH 2.180 0.400 - 4.200 uIU/mL   Anion Gap    Collection Time: 01/14/23  4:29 PM   Result Value Ref Range    Anion Gap 19.0 (H) 8.0 - 16.0 meq/L   Glomerular Filtration Rate, Estimated    Collection Time: 01/14/23  4:29 PM   Result Value Ref Range    Est, Glom Filt Rate >60 >60 ml/min/1.73m2   Osmolality    Collection Time: 01/14/23  4:29 PM   Result Value Ref Range    Osmolality Calc 265.7 (L) 275.0 - 300.0 mOsmol/kg   Ammonia    Collection Time: 01/14/23  4:30 PM   Result Value Ref Range    Ammonia 17 11 - 60 umol/L         Vital Signs: T: 97.8 F P: 89 RR: 15 B/P: 113/76: FiO2: RA: O2 Sat: 97: I/O: No intake or output data in the 24 hours ending 01/14/23 1947      General:   Patient in no acute distress, resting comfortably in bed. He appears skinny, ill-appearing, malnourished. HEENT:  normocephalic and atraumatic. No scleral icterus. PEARLA, mucous membranes moist   Neck: supple. Trachea midline. No JVD. Full ROM, no meningismus. Lungs: clear to auscultation. No retractions, no accessory muscle use. Cardiac: RRR, no murmur, 2+ pulses  Abdomen: soft. Nontender. Bowel sounds present  Extremities:  No clubbing, cyanosis x 4, no edema    Vasculature: capillary refill < 3 seconds. Skin:  warm and dry. No visible rashes  Psych:  Alert and oriented x3. Affect appropriate  Lymph:  No supraclavicular adenopathy. Neurologic:  CN II-XII grossly intact. No focal deficit. Data: (All radiographs, tracings, PFTs, and imaging are personally viewed and interpreted unless otherwise noted).      EKG: rhythm: normal sinus rhythm, rate=89 bpm, ob=519 ms, qrs=80 ms, fy=143 ms, axis=80 degrees    ***    Electronically signed by  Suki Nathan

## 2023-01-15 NOTE — PLAN OF CARE
Problem: Discharge Planning  Goal: Discharge to home or other facility with appropriate resources  1/15/2023 1042 by Lisa Gamboa RN  Outcome: Progressing  Flowsheets (Taken 1/14/2023 2030 by Joan Maki RN)  Discharge to home or other facility with appropriate resources: Refer to discharge planning if patient needs post-hospital services based on physician order or complex needs related to functional status, cognitive ability or social support system  1/15/2023 1042 by Lisa Gamboa RN  Outcome: Progressing  Flowsheets (Taken 1/14/2023 2030 by Joan Maki RN)  Discharge to home or other facility with appropriate resources: Refer to discharge planning if patient needs post-hospital services based on physician order or complex needs related to functional status, cognitive ability or social support system     Problem: Safety - Adult  Goal: Free from fall injury  1/15/2023 1042 by Lisa Gamboa RN  Outcome: Progressing  1/15/2023 1042 by Lisa Gamboa RN  Outcome: Progressing  Flowsheets (Taken 1/15/2023 1042)  Free From Fall Injury: Instruct family/caregiver on patient safety     Problem: ABCDS Injury Assessment  Goal: Absence of physical injury  1/15/2023 1042 by Lisa Gamboa RN  Outcome: Progressing  1/15/2023 1042 by Lisa Gamboa RN  Outcome: Progressing  Flowsheets (Taken 1/15/2023 1042)  Absence of Physical Injury: Implement safety measures based on patient assessment   Care plan reviewed with patient. Patient verbalize understanding of the plan of care and contribute to goal setting.

## 2023-01-15 NOTE — PROGRESS NOTES
Virtual nurse attempted to complete admission with patient. Patient cannot tell me his  and is stating his  is different than what is listed. He cannot tell me his address or alternative contacts listed and does not recognize the names when asked. He says he is staying in a motel. Unable to proceed with admission at this time due to confusion. Reported to charge nurse.

## 2023-01-15 NOTE — PROGRESS NOTES
Pharmacist Review and Automatic Dose Adjustment of Prophylactic Enoxaparin    *Review reason for admission/hospital problem list*    The reviewing pharmacist has made an adjustment to the ordered enoxaparin dose or converted to UFH per the approved [facility] protocol and table as identified below. Mariya Franklin is a 74 y.o. male. Recent Labs     01/14/23  1629   CREATININE 0.9       Estimated Creatinine Clearance: 56 mL/min (based on SCr of 0.9 mg/dL). Recent Labs     01/14/23  1510   HGB 13.2*   HCT 37.8*        Recent Labs     01/14/23  1629   INR 0.97       Height:   Ht Readings from Last 1 Encounters:   01/15/23 5' 6\" (1.676 m)     Weight:  Wt Readings from Last 1 Encounters:   01/15/23 106 lb 14.4 oz (48.5 kg)               Plan: Based upon the patient's weight and renal function, the ordered enoxaparin dose of 40 mg daily has been changed/converted to 30 mg daily.       Thank you,  Corazon Melgar, [facility]  1/15/2023, 12:57 AM

## 2023-01-15 NOTE — H&P
Hospitalist - History & Physical      Patient: Jagdish Giraldo    Unit/Bed:-01/001-A  YOB: 1955  MRN: [id number]   Acct: [de-identified]   PCP: No primary care provider on file. Assessment and Plan:        Failure to thrive in adult:   D5/NS IV infusion  CXR - Emphysematous changes predominantly within the upper lung zones. Regular diet with nutritional supplementation  Urinalysis, Vit. B12, Folate level, total cholesterol  Consider Pulmonology consult and PFT for COPD evaluation  Hyponatremia:   D5/NS IV infusion  Recheck BMP   Alcohol Abuse: Thiamine injection  EKG - NSR  Drug panel  Phenobarbital prophylaxis protocol    CC:  Fatigue    HPI: Patient is a 74 y.o. male with a history of alcohol abuse who presents to the ED with complaints of increased fatigue and loss of appetite. Patient's friends reports he has not eaten anything in a week, only a couple bites everyday, has been able to drink some water, normally drinks alcohol daily (12+ beer/day), but hasn't in 3-4 days. He has been losing weight over the last 6-8 weeks. Patient is oriented to his name and birthday, but is not oriented to the place, date, or time. Patient not meaningfully able to contribute to this HPI. Patient denies any drug use. Patient is being admitted for further evaluation and workup of FTT and treatment of alcohol dependence. Patient is poor independent historian. ROS: Review of Systems   Constitutional:  Positive for activity change, appetite change, fatigue and unexpected weight change. Negative for chills, diaphoresis and fever. HENT: Negative. Eyes: Negative. Respiratory: Negative. Cardiovascular: Negative. Gastrointestinal: Negative. Endocrine: Negative. Genitourinary: Negative. Musculoskeletal: Negative. Allergic/Immunologic: Negative. Neurological: Negative. Hematological: Negative. Psychiatric/Behavioral:  Positive for confusion and decreased concentration. Negative for hallucinations, self-injury and suicidal ideas. PMH:  History reviewed. No pertinent past medical history. SHX:    Social History     Socioeconomic History    Marital status:      Spouse name: Not on file    Number of children: Not on file    Years of education: Not on file    Highest education level: Not on file   Occupational History    Not on file   Tobacco Use    Smoking status: Every Day     Packs/day: 0.50     Years: 40.00     Pack years: 20.00     Types: Cigarettes    Smokeless tobacco: Not on file   Substance and Sexual Activity    Alcohol use:  Yes     Alcohol/week: 20.0 standard drinks     Types: 20 Cans of beer per week     Comment: social    Drug use: No    Sexual activity: Yes     Partners: Female   Other Topics Concern    Not on file   Social History Narrative    Not on file     Social Determinants of Health     Financial Resource Strain: Not on file   Food Insecurity: Not on file   Transportation Needs: Not on file   Physical Activity: Not on file   Stress: Not on file   Social Connections: Not on file   Intimate Partner Violence: Not on file   Housing Stability: Not on file     FHX:   Family History   Problem Relation Age of Onset    Arthritis Mother     High Blood Pressure Mother     Cancer Father      Allergies: No Known Allergies  Medications:     dextrose 5 % and 0.9 % NaCl 100 mL/hr at 01/14/23 2218    sodium chloride        thiamine  100 mg IntraVENous Daily    sodium chloride flush  5-40 mL IntraVENous 2 times per day    [START ON 1/15/2023] enoxaparin  40 mg SubCUTAneous Daily    PHENobarbital (LUMINAL) IVPB  4 mg/kg (Ideal) IntraVENous Q4H    Followed by    Farrah Sorensen ON 1/15/2023] PHENobarbital  64.8 mg Oral BID    Followed by    Farrah Sorensen ON 1/16/2023] PHENobarbital  32.4 mg Oral BID     sodium chloride flush, 5-40 mL, PRN  sodium chloride, , PRN  ondansetron, 4 mg, Q8H PRN   Or  ondansetron, 4 mg, Q6H PRN  polyethylene glycol, 17 g, Daily PRN  acetaminophen, 650 mg, Q6H PRN   Or  acetaminophen, 650 mg, Q6H PRN  potassium chloride, 40 mEq, PRN   Or  potassium alternative oral replacement, 40 mEq, PRN   Or  potassium chloride, 10 mEq, PRN  magnesium sulfate, 2,000 mg, PRN  No current facility-administered medications on file prior to encounter. No current outpatient medications on file prior to encounter.          Labs:   Recent Results (from the past 24 hour(s))   POCT Glucose    Collection Time: 01/14/23  3:05 PM   Result Value Ref Range    POC Glucose 107 70 - 108 mg/dl   POCT Glucose    Collection Time: 01/14/23  3:06 PM   Result Value Ref Range    QC OK? 107    CBC with Auto Differential    Collection Time: 01/14/23  3:10 PM   Result Value Ref Range    WBC 7.5 4.8 - 10.8 thou/mm3    RBC 4.35 (L) 4.70 - 6.10 mill/mm3    Hemoglobin 13.2 (L) 14.0 - 18.0 gm/dl    Hematocrit 37.8 (L) 42.0 - 52.0 %    MCV 86.9 80.0 - 94.0 fL    MCH 30.3 26.0 - 33.0 pg    MCHC 34.9 32.2 - 35.5 gm/dl    RDW-CV 12.9 11.5 - 14.5 %    RDW-SD 40.8 35.0 - 45.0 fL    Platelets 394 800 - 397 thou/mm3    MPV 9.6 9.4 - 12.4 fL    Seg Neutrophils 76.5 %    Lymphocytes 14.1 %    Monocytes 7.7 %    Eosinophils 0.3 %    Basophils 0.9 %    Immature Granulocytes 0.5 %    Segs Absolute 5.7 1.8 - 7.7 thou/mm3    Lymphocytes Absolute 1.1 1.0 - 4.8 thou/mm3    Monocytes Absolute 0.6 0.4 - 1.3 thou/mm3    Eosinophils Absolute 0.0 0.0 - 0.4 thou/mm3    Basophils Absolute 0.1 0.0 - 0.1 thou/mm3    Immature Grans (Abs) 0.04 0.00 - 0.07 thou/mm3    nRBC 0 /100 wbc   Ethanol    Collection Time: 01/14/23  3:10 PM   Result Value Ref Range    ETHYL ALCOHOL, SERUM < 0.01 0.00 %   Lactic Acid    Collection Time: 01/14/23  3:10 PM   Result Value Ref Range    Lactic Acid 2.1 (H) 0.5 - 2.0 mmol/L   EKG 12 Lead    Collection Time: 01/14/23  3:40 PM   Result Value Ref Range    Ventricular Rate 89 BPM    Atrial Rate 89 BPM    P-R Interval 132 ms    QRS Duration 80 ms    Q-T Interval 380 ms    QTc Calculation (Bazett) 462 ms    P Axis 80 degrees    R Axis 62 degrees    T Axis 76 degrees   SPECIMEN REJECTION    Collection Time: 01/14/23  4:04 PM   Result Value Ref Range    Rejected Test chems     Reason for Rejection see below    COVID-19 & Influenza Combo    Collection Time: 01/14/23  4:15 PM    Specimen: Nasopharyngeal Swab   Result Value Ref Range    SARS-CoV-2 RNA, RT PCR NOT DETECTED NOT DETECTED    INFLUENZA A NOT DETECTED NOT DETECTED    INFLUENZA B NOT DETECTED NOT DETECTED   Prealbumin    Collection Time: 01/14/23  4:29 PM   Result Value Ref Range    Prealbumin 9.1 (L) 20.0 - 40.0 mg/dl   Bilirubin, Direct    Collection Time: 01/14/23  4:29 PM   Result Value Ref Range    Bilirubin, Direct <0.2 0.0 - 0.3 mg/dL   Lipase    Collection Time: 01/14/23  4:29 PM   Result Value Ref Range    Lipase 22.3 5.6 - 51.3 U/L   APTT    Collection Time: 01/14/23  4:29 PM   Result Value Ref Range    aPTT 27.5 22.0 - 38.0 seconds   Protime-INR    Collection Time: 01/14/23  4:29 PM   Result Value Ref Range    INR 0.97 0.85 - 1.13   Magnesium    Collection Time: 01/14/23  4:29 PM   Result Value Ref Range    Magnesium 2.1 1.6 - 2.4 mg/dL   CK    Collection Time: 01/14/23  4:29 PM   Result Value Ref Range    Total CK 17 (L) 55 - 170 U/L   Comprehensive Metabolic Panel    Collection Time: 01/14/23  4:29 PM   Result Value Ref Range    Glucose 108 70 - 108 mg/dL    Creatinine 0.9 0.4 - 1.2 mg/dL    BUN 25 (H) 7 - 22 mg/dL    Sodium 130 (L) 135 - 145 meq/L    Potassium 3.7 3.5 - 5.2 meq/L    Chloride 93 (L) 98 - 111 meq/L    CO2 18 (L) 23 - 33 meq/L    Calcium 9.1 8.5 - 10.5 mg/dL    AST 93 (H) 5 - 40 U/L    Alkaline Phosphatase 154 (H) 38 - 126 U/L    Total Protein 6.6 6.1 - 8.0 g/dL    Albumin 3.5 3.5 - 5.1 g/dL    Total Bilirubin 0.4 0.3 - 1.2 mg/dL    ALT 88 (H) 11 - 66 U/L   Troponin    Collection Time: 01/14/23  4:29 PM   Result Value Ref Range    Troponin T < 0.010 ng/ml   TSH with Reflex    Collection Time: 01/14/23  4:29 PM   Result Value Ref Range TSH 2.180 0.400 - 4.200 uIU/mL   Anion Gap    Collection Time: 01/14/23  4:29 PM   Result Value Ref Range    Anion Gap 19.0 (H) 8.0 - 16.0 meq/L   Glomerular Filtration Rate, Estimated    Collection Time: 01/14/23  4:29 PM   Result Value Ref Range    Est, Glom Filt Rate >60 >60 ml/min/1.73m2   Osmolality    Collection Time: 01/14/23  4:29 PM   Result Value Ref Range    Osmolality Calc 265.7 (L) 275.0 - 300.0 mOsmol/kg   Ammonia    Collection Time: 01/14/23  4:30 PM   Result Value Ref Range    Ammonia 17 11 - 60 umol/L         Vital Signs: T: 97.8 F P: 89 RR: 15 B/P: 113/76: FiO2: RA: O2 Sat: 97: I/O: No intake or output data in the 24 hours ending 01/14/23 2330      General:   Patient in no acute distress, resting comfortably in bed. He appears thin, unkempt, malnourished. HEENT:  normocephalic and atraumatic. No scleral icterus. PEARLA, mucous membranes moist   Neck: supple. Trachea midline. No JVD. Full ROM, no meningismus. Lungs: clear to auscultation. No retractions, no accessory muscle use. Cardiac: RRR, no murmur, 2+ pulses  Abdomen: soft. Nontender. Bowel sounds present  Extremities:  No clubbing, cyanosis x 4, no edema    Vasculature: capillary refill < 3 seconds. Skin:  warm and dry. No visible rashes  Psych:  Alert and oriented x3. Affect appropriate  Lymph:  No supraclavicular adenopathy. Neurologic:  CN II-XII grossly intact. No focal deficit. Data: (All radiographs, tracings, PFTs, and imaging are personally viewed and interpreted unless otherwise noted).      EKG: rhythm: normal sinus rhythm, rate=89 bpm, xf=918 ms, qrs=80 ms, yv=147 ms, axis=80 degrees        Electronically signed by  Maryana Hill PA-C

## 2023-01-15 NOTE — PROGRESS NOTES
Hospitalist Progress Note    Patient:  Aaliyah Varma      Unit/Bed:6K-01/001-A    YOB: 1955    MRN: [id number]       Acct: [de-identified]     PCP: No primary care provider on file. Date of Admission: 1/14/2023    Assessment/Plan:    Failure to thrive in adult:   CXR - Emphysematous changes predominantly within the upper lung zones. Urinalysis, Vit. B12, Folate level, total cholesterol, thiamine, iron studies ordered and pending   Consider Pulmonology consult and PFT for COPD evaluation  PT/ OT consulted  Dietician consulted   Hyponatremia- resolved  Suspected etiology beer potomania  130 on arrival, currently 137  D5/NS IV infusion stopped- continue NS   BMP daily  Alcohol Abuse: Thiamine injection- thiamine level pending  Ammonia normal, ETOH negative   EKG - NSR  Drug panel pending   Phenobarbital prophylaxis protocol  Consider addiction services if becomes more oriented  HAGMA- improved  CO2 18, AG 19 on arrival, Currently Co2 22, [place] normal   Lactic acid 2.1- repeat pending  Metabolic encephalopathy  Consider cognitive eval pending course  Continue with treatment as above  BMP daily   Transaminasemia  AST 93, ALT 88, Alk Phos 154  US Abdomen ordered   Lipase, bilirubin normal   Hepatitis panel ordered      Expected discharge date:  pending course    Disposition:    [] Home       [] TCU       [] Rehab       [] Psych       [] SNF       [] [facility]       [x] Unknown     Chief Complaint: confusion, weight loss     Hospital Course:   Initial HPI  \" Patient is a 74 y.o. male with a history of alcohol abuse who presents to the ED with complaints of increased fatigue and loss of appetite. Patient's friends reports he has not eaten anything in a week, only a couple bites everyday, has been able to drink some water, normally drinks alcohol daily (12+ beer/day), but hasn't in 3-4 days. He has been losing weight over the last 6-8 weeks.  Patient is oriented to his name and birthday, but is not oriented to the place, date, or time. Patient not meaningfully able to contribute to this HPI. Patient denies any drug use. Patient is being admitted for further evaluation and workup of FTT and treatment of alcohol dependence. Patient is poor independent historian. 1/15/2023  Patient able to tell me his name, . Is not oriented outside of this. Unable to answer any additional questions. Able to follow basic commands           Medications:  Reviewed    Infusion Medications    dextrose 5 % and 0.9 % NaCl 100 mL/hr at 01/15/23 0547    sodium chloride       Scheduled Medications    PHENobarbital (LUMINAL) IVPB  4 mg/kg IntraVENous Q4H    Followed by    PHENobarbital  64.8 mg Oral BID    Followed by    Finis Pacer ON 2023] PHENobarbital  32.4 mg Oral BID    enoxaparin  30 mg SubCUTAneous Daily    thiamine  100 mg IntraVENous Daily    sodium chloride flush  5-40 mL IntraVENous 2 times per day     PRN Meds: sodium chloride flush, sodium chloride, ondansetron **OR** ondansetron, polyethylene glycol, acetaminophen **OR** acetaminophen, potassium chloride **OR** potassium alternative oral replacement **OR** potassium chloride, magnesium sulfate      Intake/Output Summary (Last 24 hours) at 1/15/2023 0718  Last data filed at 1/15/2023 0026  Gross per 24 hour   Intake 0 ml   Output 0 ml   Net 0 ml       Diet:  ADULT DIET; Regular    Exam:  /76   Pulse 82   Temp 98.4 °F (36.9 °C) (Oral)   Resp 16   Ht 5' 6\" (1.676 m) Comment: pt stated his ht. Wt 106 lb 14.4 oz (48.5 kg)   SpO2 96%   BMI 17.25 kg/m²     General appearance: No apparent distress, appears stated age and cooperative. He appears thin, unkempt, malnourished. HEENT: Pupils equal, round, and reactive to light. Conjunctivae/corneas clear. Neck: Supple, with full range of motion. No jugular venous distention. Trachea midline. Respiratory:  Normal respiratory effort.  Clear to auscultation, bilaterally without Rales/Wheezes/Rhonchi. Cardiovascular: Regular rate and rhythm with normal S1/S2 without murmurs, rubs or gallops. Abdomen: Soft, non-tender, non-distended with normal bowel sounds. Musculoskeletal: passive and active ROM x 4 extremities. Skin: Skin color, texture, turgor normal.  No rashes or lesions. Neurologic:  Neurovascularly intact without any focal sensory/motor deficits. Cranial nerves: II-XII intact, grossly non-focal. No asterixis   Psychiatric: Alert and oriented to self only. Impaired insight   Capillary Refill: Brisk,< 3 seconds   Peripheral Pulses: +2 palpable, equal bilaterally       Labs:   Recent Labs     01/14/23  1510   WBC 7.5   HGB 13.2*   HCT 37.8*        Recent Labs     01/14/23  1629   *   K 3.7   CL 93*   CO2 18*   BUN 25*   CREATININE 0.9   CALCIUM 9.1     Recent Labs     01/14/23  1629   AST 93*   ALT 88*   BILIDIR <0.2   BILITOT 0.4   ALKPHOS 154*     Recent Labs     01/14/23  1629   INR 0.97     Recent Labs     01/14/23  1629   CKTOTAL 17*       Microbiology:      Urinalysis:      Lab Results   Component Value Date/Time    NITRU NEGATIVE 05/16/2013 08:30 PM    WBCUA 0-2 05/16/2013 08:30 PM    BACTERIA NONE 05/16/2013 08:30 PM    RBCUA 0-2 05/16/2013 08:30 PM    BLOODU NEGATIVE 05/16/2013 08:30 PM    SPECGRAV 1.010 05/16/2013 08:30 PM       Radiology:  XR CHEST PORTABLE    Result Date: 1/14/2023  PROCEDURE: XR CHEST PORTABLE CLINICAL INFORMATION: SOB COMPARISON: 5/16/2013 TECHNIQUE:  AP mobile chest single view  FINDINGS: Calcified lymph nodes are again seen at the right hilum. The heart size appears normal. There are suggested upper zone predominant emphysematous changes. No pleural effusion is seen. No focal consolidation is demonstrated. No acute osseous findings are seen. 1. Emphysematous changes predominantly within the upper lung zones are seen. No other acute cardiopulmonary findings are otherwise demonstrated.  **This report has been created using voice recognition software. It may contain minor errors which are inherent in voice recognition technology. ** Final report electronically signed by Dr. Galen Aguilera on 1/14/2023 4:00 PM      DVT prophylaxis: [x] Lovenox                                 [] SCDs                                 [] SQ Heparin                                 [] Encourage ambulation           [] Already on Anticoagulation     Code Status: Full Code    PT/OT Eval Status: ordered    Tele:   [] yes             [x] no    Active Hospital Problems    Diagnosis Date Noted    Hyponatremia [E87.1] 01/14/2023     Priority: Medium       Electronically signed by Zulay Good PA-C on 1/15/2023 at 7:18 AM

## 2023-01-16 LAB
ANION GAP SERPL CALCULATED.3IONS-SCNC: 16 MEQ/L (ref 8–16)
BUN BLDV-MCNC: 13 MG/DL (ref 7–22)
CALCIUM SERPL-MCNC: 9 MG/DL (ref 8.5–10.5)
CHLORIDE BLD-SCNC: 102 MEQ/L (ref 98–111)
CO2: 22 MEQ/L (ref 23–33)
CREAT SERPL-MCNC: 0.7 MG/DL (ref 0.4–1.2)
GFR SERPL CREATININE-BSD FRML MDRD: > 60 ML/MIN/1.73M2
GLUCOSE BLD-MCNC: 91 MG/DL (ref 70–108)
MAGNESIUM: 2.2 MG/DL (ref 1.6–2.4)
POTASSIUM REFLEX MAGNESIUM: 3.4 MEQ/L (ref 3.5–5.2)
SODIUM BLD-SCNC: 140 MEQ/L (ref 135–145)

## 2023-01-16 PROCEDURE — 6370000000 HC RX 637 (ALT 250 FOR IP): Performed by: PHYSICIAN ASSISTANT

## 2023-01-16 PROCEDURE — 6370000000 HC RX 637 (ALT 250 FOR IP)

## 2023-01-16 PROCEDURE — 97110 THERAPEUTIC EXERCISES: CPT

## 2023-01-16 PROCEDURE — 99232 SBSQ HOSP IP/OBS MODERATE 35: CPT | Performed by: PHYSICIAN ASSISTANT

## 2023-01-16 PROCEDURE — 80048 BASIC METABOLIC PNL TOTAL CA: CPT

## 2023-01-16 PROCEDURE — 97530 THERAPEUTIC ACTIVITIES: CPT

## 2023-01-16 PROCEDURE — 6360000002 HC RX W HCPCS: Performed by: STUDENT IN AN ORGANIZED HEALTH CARE EDUCATION/TRAINING PROGRAM

## 2023-01-16 PROCEDURE — 97162 PT EVAL MOD COMPLEX 30 MIN: CPT

## 2023-01-16 PROCEDURE — 36415 COLL VENOUS BLD VENIPUNCTURE: CPT

## 2023-01-16 PROCEDURE — 83735 ASSAY OF MAGNESIUM: CPT

## 2023-01-16 PROCEDURE — 97166 OT EVAL MOD COMPLEX 45 MIN: CPT

## 2023-01-16 PROCEDURE — 1200000003 HC TELEMETRY R&B

## 2023-01-16 PROCEDURE — 6360000002 HC RX W HCPCS: Performed by: PHYSICIAN ASSISTANT

## 2023-01-16 PROCEDURE — 92523 SPEECH SOUND LANG COMPREHEN: CPT

## 2023-01-16 RX ORDER — NICOTINE 21 MG/24HR
1 PATCH, TRANSDERMAL 24 HOURS TRANSDERMAL DAILY
Status: DISCONTINUED | OUTPATIENT
Start: 2023-01-16 | End: 2023-01-18 | Stop reason: HOSPADM

## 2023-01-16 RX ORDER — LANOLIN ALCOHOL/MO/W.PET/CERES
3 CREAM (GRAM) TOPICAL NIGHTLY PRN
Status: DISCONTINUED | OUTPATIENT
Start: 2023-01-16 | End: 2023-01-18 | Stop reason: HOSPADM

## 2023-01-16 RX ORDER — POTASSIUM CHLORIDE 20 MEQ/1
20 TABLET, EXTENDED RELEASE ORAL
Status: DISCONTINUED | OUTPATIENT
Start: 2023-01-16 | End: 2023-01-17

## 2023-01-16 RX ADMIN — ACETAMINOPHEN 650 MG: 325 TABLET ORAL at 23:23

## 2023-01-16 RX ADMIN — THIAMINE HYDROCHLORIDE 100 MG: 100 INJECTION, SOLUTION INTRAMUSCULAR; INTRAVENOUS at 11:24

## 2023-01-16 RX ADMIN — POTASSIUM CHLORIDE 20 MEQ: 1500 TABLET, EXTENDED RELEASE ORAL at 09:54

## 2023-01-16 RX ADMIN — Medication 3 MG: at 23:23

## 2023-01-16 RX ADMIN — ENOXAPARIN SODIUM 30 MG: 100 INJECTION SUBCUTANEOUS at 09:54

## 2023-01-16 ASSESSMENT — PAIN DESCRIPTION - LOCATION: LOCATION: GENERALIZED

## 2023-01-16 ASSESSMENT — PAIN DESCRIPTION - DESCRIPTORS: DESCRIPTORS: ACHING

## 2023-01-16 ASSESSMENT — PAIN SCALES - GENERAL: PAINLEVEL_OUTOF10: 4

## 2023-01-16 NOTE — PLAN OF CARE
Problem: Discharge Planning  Goal: Discharge to home or other facility with appropriate resources  1/16/2023 1307 by Srinivas Rivers RN  Outcome: Progressing  Flowsheets (Taken 1/16/2023 0031 by Shola Cooper RN)  Discharge to home or other facility with appropriate resources:   Identify barriers to discharge with patient and caregiver   Arrange for needed discharge resources and transportation as appropriate   Identify discharge learning needs (meds, wound care, etc)   Arrange for interpreters to assist at discharge as needed   Refer to discharge planning if patient needs post-hospital services based on physician order or complex needs related to functional status, cognitive ability or social support system  1/16/2023 0031 by Shola Cooper RN  Outcome: Progressing  Flowsheets (Taken 1/16/2023 0031)  Discharge to home or other facility with appropriate resources:   Identify barriers to discharge with patient and caregiver   Arrange for needed discharge resources and transportation as appropriate   Identify discharge learning needs (meds, wound care, etc)   Arrange for interpreters to assist at discharge as needed   Refer to discharge planning if patient needs post-hospital services based on physician order or complex needs related to functional status, cognitive ability or social support system     Problem: Safety - Adult  Goal: Free from fall injury  1/16/2023 1307 by Srinivas Rivers RN  Outcome: Progressing  Flowsheets (Taken 1/16/2023 1307)  Free From Fall Injury: Instruct family/caregiver on patient safety  1/16/2023 0031 by Shola Cooper RN  Outcome: Progressing  Flowsheets (Taken 1/16/2023 0031)  Free From Fall Injury:   Based on caregiver fall risk screen, instruct family/caregiver to ask for assistance with transferring infant if caregiver noted to have fall risk factors   Instruct family/caregiver on patient safety     Problem: ABCDS Injury Assessment  Goal: Absence of physical injury  1/16/2023 1307 by Anna Wayne RN  Outcome: Progressing  Flowsheets (Taken 1/15/2023 1042 by Aubrey Hill, RN)  Absence of Physical Injury: Implement safety measures based on patient assessment  1/16/2023 0031 by Shraddha Randhawa RN  Outcome: Progressing  Flowsheets (Taken 1/15/2023 1042 by Aubrey Hill, RN)  Absence of Physical Injury: Implement safety measures based on patient assessment     Problem: Chronic Conditions and Co-morbidities  Goal: Patient's chronic conditions and co-morbidity symptoms are monitored and maintained or improved  1/16/2023 1307 by Anna Wayne RN  Outcome: Progressing  Flowsheets (Taken 1/16/2023 0031 by Shraddha Randhawa RN)  Care Plan - Patient's Chronic Conditions and Co-Morbidity Symptoms are Monitored and Maintained or Improved:   Monitor and assess patient's chronic conditions and comorbid symptoms for stability, deterioration, or improvement   Collaborate with multidisciplinary team to address chronic and comorbid conditions and prevent exacerbation or deterioration   Update acute care plan with appropriate goals if chronic or comorbid symptoms are exacerbated and prevent overall improvement and discharge  1/16/2023 0031 by Shraddha Randhawa RN  Outcome: Progressing  Flowsheets (Taken 1/16/2023 0031)  Care Plan - Patient's Chronic Conditions and Co-Morbidity Symptoms are Monitored and Maintained or Improved:   Monitor and assess patient's chronic conditions and comorbid symptoms for stability, deterioration, or improvement   Collaborate with multidisciplinary team to address chronic and comorbid conditions and prevent exacerbation or deterioration   Update acute care plan with appropriate goals if chronic or comorbid symptoms are exacerbated and prevent overall improvement and discharge     Problem: Skin/Tissue Integrity  Goal: Absence of new skin breakdown  Description: 1. Monitor for areas of redness and/or skin breakdown  2. Assess vascular access sites hourly  3.   Every 4-6 hours minimum: Change oxygen saturation probe site  4. Every 4-6 hours:  If on nasal continuous positive airway pressure, respiratory therapy assess nares and determine need for appliance change or resting period.   1/16/2023 1307 by Charlotte Almeida RN  Outcome: Progressing  1/16/2023 0031 by Gaurav Ortiz RN  Outcome: Progressing  Note: Turn frequently      Problem: Respiratory - Adult  Goal: Achieves optimal ventilation and oxygenation  1/16/2023 1307 by Charlotte Almeida RN  Outcome: Progressing  [address] (Taken 1/16/2023 0031 by Gaurav Ortiz RN)  Achieves optimal ventilation and oxygenation:   Assess for changes in mentation and behavior   Assess for changes in respiratory status   Position to facilitate oxygenation and minimize respiratory effort   Oxygen supplementation based on oxygen saturation or arterial blood gases   Initiate smoking cessation protocol as indicated  1/16/2023 0031 by Gaurav Ortiz RN  Outcome: Progressing  Flowsheets (Taken 1/16/2023 0031)  Achieves optimal ventilation and oxygenation:   Assess for changes in mentation and behavior   Assess for changes in respiratory status   Position to facilitate oxygenation and minimize respiratory effort   Oxygen supplementation based on oxygen saturation or arterial blood gases   Initiate smoking cessation protocol as indicated     Problem: Skin/Tissue Integrity - Adult  Goal: Skin integrity remains intact  1/16/2023 1307 by Charlotte Almeida RN  Outcome: Progressing  Flowsheets (Taken 1/16/2023 0031 by Gaurav Ortzi RN)  Skin Integrity Remains Intact:   Monitor for areas of redness and/or skin breakdown   Assess vascular access sites hourly  1/16/2023 0031 by Gaurav Ortiz RN  Outcome: Progressing  Flowsheets (Taken 1/16/2023 0031)  Skin Integrity Remains Intact:   Monitor for areas of redness and/or skin breakdown   Assess vascular access sites hourly  Goal: Incisions, wounds, or drain sites healing without S/S of infection  1/16/2023 1307 by Charlotte Almeida RN  Outcome: Progressing  Flowsheets (Taken 1/16/2023 0031 by Ara Mccoy RN)  Incisions, Wounds, or Drain Sites Healing Without Sign and Symptoms of Infection: ADMISSION and DAILY: Assess and document risk factors for pressure ulcer development  1/16/2023 0031 by Ara Mccoy RN  Outcome: Progressing  Flowsheets (Taken 1/16/2023 0031)  Incisions, Wounds, or Drain Sites Healing Without Sign and Symptoms of Infection: ADMISSION and DAILY: Assess and document risk factors for pressure ulcer development  Goal: Oral mucous membranes remain intact  1/16/2023 1307 by Angie Martinez RN  Outcome: Progressing  Flowsheets (Taken 1/16/2023 1307)  Oral Mucous Membranes Remain Intact: Assess oral mucosa and hygiene practices  1/16/2023 0031 by Ara Mccoy RN  Outcome: Progressing     Problem: Metabolic/Fluid and Electrolytes - Adult  Goal: Electrolytes maintained within normal limits  1/16/2023 1307 by Angie Martinez RN  Outcome: Low [place] (Taken 1/16/2023 0031 by Ara Mccoy RN)  Electrolytes maintained within normal limits:   Administer electrolyte replacement as ordered   Monitor labs and assess patient for signs and symptoms of electrolyte imbalances   Monitor response to electrolyte replacements, including repeat lab results as appropriate  1/16/2023 0031 by Ara Mccoy RN  Outcome: Progressing  Flowsheets (Taken 1/16/2023 0031)  Electrolytes maintained within normal limits:   Administer electrolyte replacement as ordered   Monitor labs and assess patient for signs and symptoms of electrolyte imbalances   Monitor response to electrolyte replacements, including repeat lab results as appropriate  Goal: Hemodynamic stability and optimal renal function maintained  1/16/2023 1307 by Angie Martinez RN  Outcome: Progressing  Flowsheets (Taken 1/16/2023 0031 by Ara Mccoy RN)  Hemodynamic stability and optimal renal function maintained:   Encourage oral intake as appropriate   Monitor response to interventions for patient's volume status, including labs, urine output, blood pressure (other measures as available)   Monitor urine specific gravity, serum osmolarity and serum sodium as indicated or ordered   Monitor intake, output and patient weight   Monitor labs and assess for signs and symptoms of volume excess or deficit  1/16/2023 0031 by Aurora Owens RN  Outcome: Progressing  Flowsheets (Taken 1/16/2023 0031)  Hemodynamic stability and optimal renal function maintained:   Encourage oral intake as appropriate   Monitor response to interventions for patient's volume status, including labs, urine output, blood pressure (other measures as available)   Monitor urine specific gravity, serum osmolarity and serum sodium as indicated or ordered   Monitor intake, output and patient weight   Monitor labs and assess for signs and symptoms of volume excess or deficit  Goal: Glucose maintained within prescribed range  1/16/2023 1307 by Louann Don RN  Outcome: Progressing  Flowsheets (Taken 1/16/2023 0031 by Aurora Owens RN)  Glucose maintained within prescribed range:   Instruct patient on self management of diabetes and initiate consult as needed   Assess barriers to adequate nutritional intake and initiate nutrition consult as needed   Administer ordered medications to maintain glucose within target range   Assess for signs and symptoms of hyperglycemia and hypoglycemia   Monitor blood glucose as ordered  1/16/2023 0031 by Aurora Owens RN  Outcome: Progressing  Flowsheets (Taken 1/16/2023 0031)  Glucose maintained within prescribed range:   Instruct patient on self management of diabetes and initiate consult as needed   Assess barriers to adequate nutritional intake and initiate nutrition consult as needed   Administer ordered medications to maintain glucose within target range   Assess for signs and symptoms of hyperglycemia and hypoglycemia   Monitor blood glucose as ordered

## 2023-01-16 NOTE — PLAN OF CARE
Problem: Discharge Planning  Goal: Discharge to home or other facility with appropriate resources  1/16/2023 0031 by Aleah Herman RN  Outcome: Progressing  Flowsheets (Taken 1/16/2023 0031)  Discharge to home or other facility with appropriate resources:   Identify barriers to discharge with patient and caregiver   Arrange for needed discharge resources and transportation as appropriate   Identify discharge learning needs (meds, wound care, etc)   Arrange for interpreters to assist at discharge as needed   Refer to discharge planning if patient needs post-hospital services based on physician order or complex needs related to functional status, cognitive ability or social support system  1/15/2023 1042 by Brittney Chairez RN  Outcome: Progressing  [address] (Taken 1/14/2023 2030 by Reymundo Cox, RN)  Discharge to home or other facility with appropriate resources: Refer to discharge planning if patient needs post-hospital services based on physician order or complex needs related to functional status, cognitive ability or social support system  1/15/2023 1042 by Brittney Chairez RN  Outcome: Progressing  Flowsheets (Taken 1/14/2023 2030 by Reymundo Cox, RN)  Discharge to home or other facility with appropriate resources: Refer to discharge planning if patient needs post-hospital services based on physician order or complex needs related to functional status, cognitive ability or social support system     Problem: Safety - Adult  Goal: Free from fall injury  1/16/2023 0031 by Aleah Herman RN  Outcome: Progressing  Flowsheets (Taken 1/16/2023 0031)  Free From Fall Injury:   Based on caregiver fall risk screen, instruct family/caregiver to ask for assistance with transferring infant if caregiver noted to have fall risk factors   Instruct family/caregiver on patient safety  1/15/2023 1042 by Brittney Chairez RN  Outcome: Progressing  1/15/2023 1042 by Brittney Chairez RN  Outcome: Progressing  Flowsheets (Taken 1/15/2023 1042)  Free From Fall Injury: Instruct family/caregiver on patient safety     Problem: ABCDS Injury Assessment  Goal: Absence of physical injury  1/16/2023 0031 by Estrella Cramer RN  Outcome: Dipti Christiansen (Taken 1/15/2023 1042 by Joseluis Carcamo RN)  Absence of Physical Injury: Implement safety measures based on patient assessment  1/15/2023 1042 by Joseluis Carcamo RN  Outcome: Progressing  1/15/2023 1042 by Joseluis Carcamo RN  Outcome: Progressing  Flowsheets (Taken 1/15/2023 1042)  Absence of Physical Injury: Implement safety measures based on patient assessment     Problem: Skin/Tissue Integrity  Goal: Absence of new skin breakdown  Description: 1. Monitor for areas of redness and/or skin breakdown  2. Assess vascular access sites hourly  3. Every 4-6 hours minimum:  Change oxygen saturation probe site  4. Every 4-6 hours:  If on nasal continuous positive airway pressure, respiratory therapy assess nares and determine need for appliance change or resting period.   Outcome: Progressing  Note: Turn frequently      Problem: Skin/Tissue Integrity - Adult  Goal: Skin integrity remains intact  Outcome: Progressing  Flowsheets (Taken 1/16/2023 0031)  Skin Integrity Remains Intact:   Monitor for areas of redness and/or skin breakdown   Assess vascular access sites hourly  Goal: Incisions, wounds, or drain sites healing without S/S of infection  Outcome: Progressing  Flowsheets (Taken 1/16/2023 0031)  Incisions, Wounds, or Drain Sites Healing Without Sign and Symptoms of Infection: ADMISSION and DAILY: Assess and document risk factors for pressure ulcer development  Goal: Oral mucous membranes remain intact  Outcome: Progressing     Problem: Metabolic/Fluid and Electrolytes - Adult  Goal: Electrolytes maintained within normal limits  Outcome: Progressing  Flowsheets (Taken 1/16/2023 0031)  Electrolytes maintained within normal limits:   Administer electrolyte replacement as ordered   Monitor labs and assess patient for signs and symptoms of electrolyte imbalances   Monitor response to electrolyte replacements, including repeat lab results as appropriate  Goal: Hemodynamic stability and optimal renal function maintained  Outcome: Progressing  Flowsheets (Taken 1/16/2023 0031)  Hemodynamic stability and optimal renal function maintained:   Encourage oral intake as appropriate   Monitor response to interventions for patient's volume status, including labs, urine output, blood pressure (other measures as available)   Monitor urine specific gravity, serum osmolarity and serum sodium as indicated or ordered   Monitor intake, output and patient weight   Monitor labs and assess for signs and symptoms of volume excess or deficit  Goal: Glucose maintained within prescribed range  Outcome: Progressing  Flowsheets (Taken 1/16/2023 0031)  Glucose maintained within prescribed range:   Instruct patient on self management of diabetes and initiate consult as needed   Assess barriers to adequate nutritional intake and initiate nutrition consult as needed   Administer ordered medications to maintain glucose within target range   Assess for signs and symptoms of hyperglycemia and hypoglycemia   Monitor blood glucose as ordered

## 2023-01-16 NOTE — PROGRESS NOTES
01/16/23 1603   Encounter Summary   Encounter Overview/Reason  Initial Encounter; Attempted Encounter   Service Provided For: Patient not available   Support System Unknown   Begin Time 1525   End Time  1530   Total Time Calculated 5 min   Assessment/Intervention/Outcome   Assessment Calm; Unable to assess   Intervention Sustaining Presence/Ministry of presence   Outcome Acceptance   Plan and Referrals   Plan/Referrals Continue to visit, (comment)     ASSESSMENT  P is 82-year-old male diagnosed with hyponatremia. The room was open. As I entered, I noticed he was sleeping. The patient was unavailable.

## 2023-01-16 NOTE — PROGRESS NOTES
This nurse in room to administrated patient's HS medication. Pt has scheduled Phenobarbital. Pt has to have physical stimulation in order to be awaken. This nurse will contact doctor to update on patient's condition. 1/15/23 10:30 PM  [phone] From: Francisca Franklin rn [facility] 6A RE: Ida Adelamichaela Pt has daren. Phenobarbital for 2100. Pt needs physical stimulation to be awaken. Pt's blood pressure is 101/70. Is it okay to hold the 2100 dose? Read 10:30 PM   1/15/23 10:30 PM   Yeah let's hold     1/15/23 10:31 PM  thank you, I will cont to monitor patient and let you know of any changes.   Read 10:32 PM     1/15/23 10:32 PM   Sounds good

## 2023-01-16 NOTE — PROGRESS NOTES
Hospitalist Progress Note    Patient:  Chris Plaza      Unit/Bed:6K-01/001-A    YOB: 1955    MRN: [id number]       Acct: [de-identified]     PCP: No primary care provider on file. Date of Admission: 1/14/2023    Assessment/Plan:    Failure to thrive in adult:   CXR - Emphysematous changes predominantly within the upper lung zones. Urinalysis pending   Vit. B12, Folate level normal,   thiamine, iron studies ordered and pending   Consider Pulmonology consult and PFT for COPD evaluation- recommend outpatient as patient currently asymptomatic and not requiring any supplemental O2   PT/ OT consulted- recommend SNF and 24 hour supervision  Dietician consulted   Triglycerides and prealbumin low consistent with malnutrition  Hypokalemia  Mild at 3.4  Start Kcl 20meq daily   BMP daily   Hyponatremia- resolved  Suspected etiology beer potomania  130 on arrival, currently 140  D5/NS IV infusion stopped- continue NS   BMP daily  Alcohol Abuse: Thiamine injection- thiamine level pending  Ammonia normal, ETOH negative   EKG - NSR   Phenobarbital prophylaxis protocol    HAGMA- improved  CO2 18, AG 19 on arrival, Currently Co2 22, Gap normal   Lactic acid 2.1 initially- normal now   Tobacco Abuse  According to daughters, 3 packs per day chronically  Nicotine patch ordered.    Metabolic encephalopathy secondary to chronic alcohol abuse and starvation - improving  Cognitive evaluation ordered  Continue with treatment as above  BMP daily   Transaminasemia secondary to Alcohol Fatty Liver Disease  AST 93, ALT 88, Alk Phos 154  US Abdomen demonstrating fatty liver   Lipase, bilirubin normal   Hepatitis panel negative      Expected discharge date:  pending course    Disposition:    [] Home       [] TCU       [] Rehab       [] Psych       [] SNF       [] [facility]       [x] Unknown     Chief Complaint: confusion, weight loss     Hospital Course:   Initial HPI  \" Patient is a 74 y.o. male with a history of alcohol abuse who presents to the ED with complaints of increased fatigue and loss of appetite. Patient's friends reports he has not eaten anything in a week, only a couple bites everyday, has been able to drink some water, normally drinks alcohol daily (12+ beer/day), but hasn't in 3-4 days. He has been losing weight over the last 6-8 weeks. Patient is oriented to his name and birthday, but is not oriented to the place, date, or time. Patient not meaningfully able to contribute to this HPI. Patient denies any drug use. Patient is being admitted for further evaluation and workup of FTT and treatment of alcohol dependence. Patient is poor independent historian. 1/15/2023  Patient able to tell me his name, . Is not oriented outside of this. Unable to answer any additional questions. Able to follow basic commands     2023  Patient is more alert today- orientation is still impaired  Daughters are at bedside  BP's are soft- Bolus of fluids ordered to allow for more room for phenobarb protocol. PT recommending SNF - Daughters agree       Medications:  Reviewed    Infusion Medications    sodium chloride       Scheduled Medications    PHENobarbital  64.8 mg Oral BID    Followed by    PHENobarbital  32.4 mg Oral BID    enoxaparin  30 mg SubCUTAneous Daily    thiamine  100 mg IntraVENous Daily    sodium chloride flush  5-40 mL IntraVENous 2 times per day     PRN Meds: sodium chloride flush, sodium chloride, ondansetron **OR** ondansetron, polyethylene glycol, acetaminophen **OR** acetaminophen, potassium chloride **OR** potassium alternative oral replacement **OR** potassium chloride, magnesium sulfate      Intake/Output Summary (Last 24 hours) at 2023 0733  Last data filed at 1/15/2023 1455  Gross per 24 hour   Intake 240 ml   Output --   Net 240 ml         Diet:  ADULT DIET;  Regular    Exam:  BP 94/71   Pulse 71   Temp 97.9 °F (36.6 °C) (Oral)   Resp 16   Ht 5' 6\" (1.676 m) Comment: pt stated his ht. Wt 106 lb 14.4 oz (48.5 kg)   SpO2 100%   BMI 17.25 kg/m²     General appearance: No apparent distress, appears stated age and cooperative. He appears thin, unkempt, malnourished. HEENT: Pupils equal, round, and reactive to light. Conjunctivae/corneas clear. Neck: Supple, with full range of motion. No jugular venous distention. Trachea midline. Respiratory:  Normal respiratory effort. Clear to auscultation, bilaterally without Rales/Wheezes/Rhonchi. Cardiovascular: Regular rate and rhythm with normal S1/S2 without murmurs, rubs or gallops. Abdomen: Soft, non-tender, non-distended with normal bowel sounds. Musculoskeletal: passive and active ROM x 4 extremities. Skin: Skin color, texture, turgor normal.  No rashes or lesions. Neurologic:  Neurovascularly intact without any focal sensory/motor deficits. Cranial nerves: II-XII intact, grossly non-focal. No asterixis   Psychiatric: Alert and oriented to self only.  Impaired insight   Capillary Refill: Brisk,< 3 seconds   Peripheral Pulses: +2 palpable, equal bilaterally       Labs:   Recent Labs     01/14/23  1510 01/15/23  0704   WBC 7.5 6.5   HGB 13.2* 10.5*   HCT 37.8* 30.9*    226       Recent Labs     01/14/23  1629 01/15/23  0704   * 137   K 3.7 3.6   CL 93* 102   CO2 18* 22*   BUN 25* 19   CREATININE 0.9 0.8   CALCIUM 9.1 8.5       Recent Labs     01/14/23  1629   AST 93*   ALT 88*   BILIDIR <0.2   BILITOT 0.4   ALKPHOS 154*       Recent Labs     01/14/23  1629   INR 0.97       Recent Labs     01/14/23  1629   CKTOTAL 17*         Microbiology:      Urinalysis:      Lab Results   Component Value Date/Time    NITRU NEGATIVE 05/16/2013 08:30 PM    WBCUA 0-2 05/16/2013 08:30 PM    BACTERIA NONE 05/16/2013 08:30 PM    RBCUA 0-2 05/16/2013 08:30 PM    BLOODU NEGATIVE 05/16/2013 08:30 PM    SPECGRAV 1.010 05/16/2013 08:30 PM       Radiology:  XR CHEST PORTABLE    Result Date: 1/14/2023  PROCEDURE: XR CHEST PORTABLE CLINICAL INFORMATION: SOB COMPARISON: 5/16/2013 TECHNIQUE:  AP mobile chest single view  FINDINGS: Calcified lymph nodes are again seen at the right hilum. The heart size appears normal. There are suggested upper zone predominant emphysematous changes. No pleural effusion is seen. No focal consolidation is demonstrated. No acute osseous findings are seen. 1. Emphysematous changes predominantly within the upper lung zones are seen. No other acute cardiopulmonary findings are otherwise demonstrated. **This report has been created using voice recognition software. It may contain minor errors which are inherent in voice recognition technology. ** Final report electronically signed by Dr. Christelle Vaughan on 1/14/2023 4:00 PM      DVT prophylaxis: [x] Lovenox                                 [] SCDs                                 [] SQ Heparin                                 [] Encourage ambulation           [] Already on Anticoagulation     Code Status: Full Code    PT/OT Eval Status: ordered    Tele:   [] yes             [x] no    Active Hospital Problems    Diagnosis Date Noted    Hyponatremia [E87.1] 01/14/2023     Priority: Medium       Electronically signed by Melvin Ospina PA-C on 1/16/2023 at 7:33 AM

## 2023-01-16 NOTE — PROGRESS NOTES
[facility]  SPEECH THERAPY  STRZ RENAL TELEMETRY 6K  Speech - Language - Cognitive Evaluation    SLP Individual Minutes  Time In: 1345  Time Out: 1407  Minutes: 22  Timed Code Treatment Minutes: 0 Minutes       Date: 2023  Patient Name: Flako Merrill      CSN: [id number]   : 1955  (67 y.o.)  Gender: male   Referring Physician:  Brooke Mckeon PA-C  Diagnosis: Alcohol Abuse  Precautions: Fall risk  History of Present Illness/Injury: Patient admitted to [facility] with above medical dx.  Per chart review, \"Patient is a 67 y.o. male with a history of alcohol abuse who presents to the ED with complaints of increased fatigue and loss of appetite. Patient's friends reports he has not eaten anything in a week, only a couple bites everyday, has been able to drink some water, normally drinks alcohol daily (12+ beer/day), but hasn't in 3-4 days. He has been losing weight over the last 6-8 weeks. Patient is oriented to his name and birthday, but is not oriented to the place, date, or time. Patient not meaningfully able to contribute to this HPI. Patient denies any drug use. \"    ST consulted to complete cognition evaluation to establish POC.    History reviewed. No pertinent past medical history.    Pain: No pain reported.    Subjective:  RN with approval to proceed with evaluation.  Patient resting in bed upon ST arrival.  Patient required increased encouragement to complete assessment.  Patient removed IV, RN informed and cleaned patient up and bandaged arm.    SOCIAL HISTORY:   Living Arrangements: By self  Work History:  Working  Driving Status: Active   Finance Management: Independent  Medication Management: Independent  ADL's: Independent.   Vision Status: Glasses  Hearing: WFL  Type of Home: House  Home Layout: Two level, Bed/Bath upstairs  Home Access:  (flight of steps to 2nd floor)  Home Equipment: Walker, rolling, Cane, Grab bars    SPEECH / VOICE:  Speech and Voice appear to be  grossly intact for basic and complex daily communication    LANGUAGE:  Receptive:  1 Step Commands: 3/3  2 Step Commands: 1/3  Simple Yes/No Questions: 2/3    Expressive:  Automatic Speech: 1/2  Divergent Naming (abstract): 0 items named    COGNITION:  Jean Cognitive Assessment (MOCA) version 7.1 completed. Patient scored 3/25. Normal is greater than or equal to 26/30. Orientation: 0/6  Immediate Recall: 4/5  Short-Term Recall: 0/5  Divergent Namin items named in given 1 minute time frame  Reasonin/2  Sequencin/2  Thought Organization: Impaired  Insight: Impaired  Attention: Impaired  Math Computation: 0/3    SWALLOWING:  Current Diet: Regular diet and thin lioquids  Not Tested         RECOMMENDATIONS/ASSESSMENT:  DIAGNOSTIC IMPRESSIONS:  Patient presents with severe cognitive deficits evidenced by difficulties with immediate and delayed recall, sequencing, attention, reasoning, divergent naming, and orientation. Patient reported independence with ADLS and IADLS prior to hospital stay. ST recommends patient receive skilled services to address deficits mentioned above to permit safe return to ADLS, IADLS, and driving. Rehabilitation Potential: fair  Discharge Recommendations: Continue to Assess Pending Progress    EDUCATION:  Learner: Patient  Education:  Reviewed results and recommendations of this evaluation  Evaluation of Education: Verbalizes understanding, Needs further instruction, No evidence of learning, and Family not present    PLAN:  Skilled SLP intervention on acute care 3-5 x per week or until goals met and/or pt plateaus in function. Specific interventions for next session may include: cognitive tasks. PATIENT GOAL:    Did not state. Will further assess during treatment.     SHORT TERM GOALS:  Short Term Goals  Time Frame for Short Term Goals: 2 weeks  Goal 1: Patient will complete orientation information with 70% accuracy to contribute to safety and awarness of surroundings. Goal 2: Patient will complete immediate and delayed recall with 70% accuracy to improve retention of pertinent information. Goal 3: Patient will complete thought organization and exectutive functioning (time, money, math, medication management) with 70% accuracy to contribute to ADLS and IADLS. Goal 4: Patient will complete reasoning and sequencing (divergent naming) with 70% accuracy to contribute to ADLS and IADLS. Goal 5: Patient will complete attention tasks with 70% accuracy to permit safe return to driving. LONG TERM GOALS:  No LTG established due to short ELS.     Hillary Minor M.A. CF-SLP

## 2023-01-16 NOTE — PLAN OF CARE
Problem: Discharge Planning  Goal: Discharge to home or other facility with appropriate resources  1/16/2023 1320 by NORMA Lazo, NADINE  Outcome: Progressing  Flowsheets (Taken 1/16/2023 1320)  Discharge to home or other facility with appropriate resources: Identify barriers to discharge with patient and caregiver  Note: Agreeable to a rehab stay, see SW notes 1/16/23.

## 2023-01-16 NOTE — CARE COORDINATION
Case Management Assessment  Initial Evaluation    Date/Time of Evaluation: 1/16/2023 11:35 AM  Assessment Completed by: Krystyna Jacques RN    If patient is discharged prior to next notation, then this note serves as note for discharge by case management. Patient Name: Kelle Peters                   YOB: 1955  Diagnosis: Alcohol abuse [F10.10]  Hyponatremia [J09.1]  Metabolic acidosis [O52.41]  Failure to thrive in adult [R62.7]  Protein-calorie malnutrition, unspecified severity ([facility] [address] Yenny Montilla                   Date / Time: 1/14/2023  2:43 PM  Location: [facility]     Patient Admission Status: Inpatient   Readmission Risk (Low < 19, Mod (19-27), High > 27): Readmission Risk Score: 11.7    Current PCP: No primary care provider on file. PCP verified by CM? Yes    Chart Reviewed: Yes      History Provided by: Patient, Child/Family  Patient Orientation: Person, Place    Patient Cognition: Alert    Hospitalization in the last 30 days (Readmission):  No    If yes, Readmission Assessment in  Navigator will be completed. Advance Directives:      Code Status: Full Code   Patient's Primary Decision Maker is: Patient Declined (Legal Next of Kin Remains as Decision Maker)      Discharge Planning:    Patient lives with: Friends Type of Home: Trailer/Mobile Home  Primary Care Giver: Self  Patient Support Systems include: Spouse/Significant Other, Children, Friends/Neighbors   Current Financial resources: Medicare, Medicaid  Current community resources: None  Current services prior to admission: None            Current DME:              Type of Home Care services:  None    ADLS  Prior functional level: Independent in ADLs/IADLs  Current functional level: Assistance with the following:    Family can provide assistance at DC: Yes  Would you like Case Management to discuss the discharge plan with any other family members/significant others, and if so, who?  No (daughters present for discussion)  Plans to Return to Present Housing: Yes  Other Identified Issues/Barriers to RETURNING to current housing: none  Potential Assistance needed at discharge: Other (Comment) (addiction services)            Potential DME:    Patient expects to discharge to: Trailer/mobile home  Plan for transportation at discharge: Family    Financial    Payor: MEDICARE / Plan: MEDICARE PART A AND B / Product Type: *No Product type* /     Does insurance require precert for SNF: No    Potential assistance Purchasing Medications: No  Meds-to-Beds request:      No Pharmacies Listed    Notes:    Factors facilitating achievement of predicted outcomes: Friend support    Barriers to discharge: Limited safety awareness, Limited insight into deficits, and Medical complications    Additional Case Management Notes: Anemia profile abn. Phenobarb, scheduled. Abd US pending. Dietitian and addiction services consulted. PT/OT. The Plan for Transition of Care is related to the following treatment goals of Alcohol abuse [F10.10]  Hyponatremia [H81.1]  Metabolic acidosis [D03.37]  Failure to thrive in adult [R62.7]  Protein-calorie malnutrition, unspecified severity ([facility] [address] [E46]    Patient Goals/Plan/Treatment Preferences: Met with [facility] and his daughters. He confirms that he lives with friends. He states his PCP is Dr. Fco Jennings in [place] (attempted to confirm but online report shows Dr. Fco Jennings passed away in 2015), daughters state \"he doesn't go to appointments\". Denies need for DME or HH at this time. Transportation/Food Security/Housekeeping Addressed: No issues identified. Yadi Mccarty RN  Case Management Department     **Update: Brigida RAMOS informs that daughters are interested in pursuing guardianship, and ECF for [facility]. Sushil Sal consulted and updated.  Electronically signed by Yadi Mccarty RN on 1/16/2023 at 11:36 AM

## 2023-01-16 NOTE — PROGRESS NOTES
Per epic pt is disoriented to place, time and situation. RAFIQ to complete AOD consult when pt is more alert.

## 2023-01-16 NOTE — CARE COORDINATION
DISCHARGE PLANNING EVALUATION  1/16/23, 1:17 PM EST    Reason for Referral: placement  Mental Status: Patient is alert to name  Decision Making: Patient is making decisions, daughters are next of kin and present  Family/Social/Home Environment: Patient is from home mostly independent with girlfriend and daughter are involved. Patient is agreeable to a rehab stay. Current Services including food security, transportation and housekeeping: see above  Current Equipment: none  Payment Source: Medicare, Medicaid  Concerns or Barriers to Discharge: not at this time  Post-acute [facility]) provider list was provided to patient. Patient was informed of their freedom to choose [facility] provider. Discussed and offered to show the patient the relevant [facility] Providers quality and resource use measures on Medicare Compare web site via computer based on patient's goals of care and treatment preferences. Questions regarding selection process were answered. Teach Back Method used with Patient regarding care plan and discharge plan. Patient and daughters verbalize understanding of the plan of care and contribute to goal setting. Patient goals, treatment preferences and discharge plan: Patient agreeable to rehab stay, prefer [facility] second option is [facility]. Referral made to Leandra at [facility].     Electronically signed by NORMA Desouza, KARONW on 1/16/2023 at 1:17 PM

## 2023-01-16 NOTE — PROGRESS NOTES
[facility]  INPATIENT OCCUPATIONAL THERAPY  STRZ RENAL TELEMETRY 6K  EVALUATION    Time:    Time In:   Time Out: 1338  Timed Code Treatment Minutes: 9 Minutes  Minutes: 24          Date: 2023  Patient Name: Saul Hutton,   Gender: male      MRN: [id number]  : 1955  (74 y.o.)  Referring Practitioner: Petar Collado PA-C  Diagnosis: alcohol abuse  Additional Pertinent Hx: Per ER note on 2023: 74 y.o. male who presents to the emergency department from from home, brought in by EMS for evaluation of weakness and loss of appetite. Patient admits to past medical history of COPD and alcohol abuse. Patient presents to the emergency department with confusion, fatigue and loss of appetite. He states he was brought in by his friends and has not had anything to eat \"for a while, and feeling really weak. \"  He admits to alcohol abuse drinking 16-32 alcoholic beverages daily, last alcoholic beverage was 3-4 days ago. Patient admits to prior hospitalization for alcohol withdrawals, currently denies symptoms of alcohol withdrawal.    Restrictions/Precautions:  Restrictions/Precautions: General Precautions, Fall Risk  Position Activity Restriction  Other position/activity restrictions: monitor BP    Subjective  Chart Reviewed: Yes, Orders, Progress Notes, History and Physical  Patient assessed for rehabilitation services?: Yes  Family / Caregiver Present: Yes    Subjective: Pt asleep in recliner upon arrival of therapist, awoken easily & agreeable to back to bed.  Pt noted to be confused & at one point seemed to be hallucinating (leaned over & yelled under the bed)    Pain: 0/10: no c/o pain during session    Vitals: Vitals not assessed per clinical judgement, see nursing flowsheet    Social/Functional History:  Lives With: Alone  Type of Home: House  Home Layout: Two level, Bed/Bath upstairs  Home Access:  (flight of steps to 2nd floor)  Home Equipment: Walker, rolling, Cane, Grab bars Bathroom Shower/Tub: Tub/Shower unit  Bathroom Toilet: Standard       ADL Assistance: Independent  Homemaking Assistance: Independent  Ambulation Assistance: Independent  Transfer Assistance: Independent    Active : No  Patient's  Info: girlfriend drives  Occupation: Retired  Additional Comments: pt is poor historian, unsure of accuracy of information due to cognitive status    VISION:WFL    HEARING:   mild [place] noted    COGNITION: Decreased Insight, Decreased Problem Solving, Decreased Safety Awareness, and question if Pt might have been hallucinating at one point    RANGE OF MOTION:  Bilateral Upper Extremity:  WFL    STRENGTH:  Bilateral Upper Extremity:  WFL    SENSATION:   WFL    ADL:   Footwear Management: Stand By Assistance. Able to cross BLE up to adjust slipper socks (while seated in recliner) . BALANCE:  Sitting Balance:  Contact Guard Assistance. Standing Balance: Contact Guard Assistance. Static then min A with dynamic    BED MOBILITY:  Sit to Supine: Stand By Assistance      TRANSFERS:  Sit to Stand:  Contact Guard Assistance. From recliner  Stand to Sit: [address] To EOB    FUNCTIONAL MOBILITY:  Assistive Device: Rolling Walker  Assist Level:  Minimal Assistance. Distance:  3ft from recliner to EOB  Vcs for posture & walker safety  **noted tremors   **building agitation toward end of session-nurse was notified    Exercise:  Completed 1 exercises x 10 reps for L elbow flexion/extension (unable to get Pt to participate any further in exercises). Activity Tolerance:  Patient tolerance of  treatment: fair. Assessment:  Assessment: Pt demo decreased indep with ADLs & functional mobility over PLOF s/p admission with alcohol abuse. Continued OT recommended to faciliate improved balance, endurance, & safety awareness for increasing indep & safety for eventual returning to ADLs at home.   Performance deficits / Impairments: Decreased functional mobility , Decreased ADL status, Decreased endurance, Decreased balance, Decreased safe awareness, Decreased cognition, Decreased strength  Prognosis: Fair  REQUIRES OT FOLLOW-UP: Yes  Decision Making: Medium Complexity    Treatment Initiated: Treatment and education initiated within context of evaluation. Evaluation time included review of current medical information, gathering information related to past medical, social and functional history, completion of standardized testing, formal and informal observation of tasks, assessment of data and development of plan of care and goals. Treatment time included skilled education and facilitation of tasks to increase safety and independence with ADL's for improved functional independence and quality of life. Discharge Recommendations:  Subacute/Skilled Nursing Facility, Continue to assess pending progress (Not safe to return home alone at this time.)    Patient Education:     Patient Education  Education Given To: Patient  Education Provided: Role of Therapy, Plan of Care, Transfer Training  Education Method: Demonstration, Verbal  Barriers to Learning: Cognition  Education Outcome: Continued education needed    Equipment Recommendations: Other: Monitor pending progress    Plan:  Times Per Week: 3-5x  Current Treatment Recommendations: Functional mobility training, Balance training, Strengthening, Self-Care / ADL, Safety education & training, Endurance training. See long-term goal time frame for expected duration of plan of care. If no long-term goals established, a short length of stay is anticipated.     Goals:  Patient goals : Pt did not state  Short Term Goals  Time Frame for Short Term Goals: until discharge  Short Term Goal 1: Pt will complete various sit-stand t/fs including toilet with CGA & 0-2 vcs for safety  Short Term Goal 2: Pt will complete mobility to/from bathroom with RW, CGA, & 0-2 vcs for safety  Short Term Goal 3: Pt will tolerate standing 3-4 min with CGA for increased ease of sinkside grooming  Short Term Goal 4: Pt will complete BADL routine with min A & min vcs for safety/sequencing/completeness  Long Term Goals  Time Frame for Long Term Goals : No LTG set d/t short ELOS         Following session, patient left in safe position with all fall risk precautions in place.

## 2023-01-16 NOTE — PROGRESS NOTES
[facility]  INPATIENT PHYSICAL THERAPY  EVALUATION  [facility] RENAL TELEMETRY 6K - 6K-01/001-A    Time In: 8663  Time Out: 1015  Timed Code Treatment Minutes: 14 Minutes  Minutes: 23          Date: 2023  Patient Name: Ignacio Stovall,  Gender:  male        MRN: [id number]  : 1955  (74 y.o.)      Referring Practitioner: Jair Casey PA-C  Diagnosis: alcohol abuse  Additional Pertinent Hx: Per EMR \"Flako aPris is a pleasant 74 y.o. male who presents to the emergency department from from home, brought in by EMS for evaluation of weakness and loss of appetite. Patient admits to past medical history of COPD and alcohol abuse. Patient presents to the emergency department with confusion, fatigue and loss of appetite. He states he was brought in by his friends and has not had anything to eat \"for a while, and feeling really weak. \"  He admits to alcohol abuse drinking 44-68 alcoholic beverages daily, last alcoholic beverage was 3-4 days ago. Patient admits to prior hospitalization for alcohol withdrawals, currently denies symptoms of alcohol withdrawal.  Denies any symptoms of pain, shortness of breath, tremors, palpitations, headaches, anxiety, GI upset, headaches, diaphoresis. His friend Pham Glass and girlfriend Gina Hendrix also present reporting changes in behavior, more aggressive, increased sleep, reporting he has not had a meal in at least 2 weeks only eating 2 or 3 bites per meal and losing weight over the last 6-8 weeks. Gina Hendrix states she will cook him meals on a regular basis but he refuses to eat. Over the last couple days he has also stopped drinking alcohol due to poor appetite. Parent also states he has refused to go to the doctor in the past.     Admits to significant alcohol abuse: 89-41 tall alcoholic beverages daily.   Daily smoker   Admits to marijuana use, declines other illicit substances\"     Restrictions/Precautions:  Restrictions/Precautions: General Precautions, Fall Risk  Position Activity Restriction  Other position/activity restrictions: monitor BP    Subjective:  Chart Reviewed: Yes  Patient assessed for rehabilitation services?: Yes  Family / Caregiver Present: No  Subjective: OK to see pt per nursing. Mukesh reporting BP was a little low this AM, however, it runs low normally, therapy session approved. Pt in bed when PT arrived, slow eating late breakfast. Pt agreeable to PT session and to sit in chair. Pt required increased time for following commands and to process commands this date during session. Communicated with nursing following session about obtaining speech therapy orders for cog eval and possible swallow assessment. Pt also noted to ask x2, Cindy Galvan is out there\" referring to the hallway. Pt was oriented to the hospital and that was the hallway. General:  Overall Orientation Status: Impaired  Orientation Level: Oriented to person, Disoriented to place, Disoriented to time, Disoriented to situation  Overall Cognitive Status: Exceptions  Vision: Within Functional Limits  Hearing: Within functional limits       Pain: denies    Vitals: Nurse checked vitals prior to session 98/66 BP    Social/Functional History:    Lives With: Alone  Type of Home: House  Home Layout: Two level, Bed/Bath upstairs  Home Access:  (flight of steps to 2nd floor)  Home Equipment: Walker, rolling, Cane, Grab bars     Bathroom Shower/Tub: Tub/Shower unit  Bathroom Toilet: Standard       ADL Assistance: Independent  Homemaking Assistance: Independent  Ambulation Assistance: Independent  Transfer Assistance: Independent    Active : No  Occupation: Retired  Additional Comments: pt is poor historian, unsure of accuracy of information due to cognitive status    OBJECTIVE:  Range of Motion:  Bilateral Lower Extremity: WFL    Strength:  Bilateral Lower Extremity: Impaired - grossly deconditioned, not able to assess MMT due to cognitive status and increased difficulty following commands. Balance:  Static Sitting Balance:  Stand By Assistance, Contact Guard Assistance  Static Standing Balance: Minimal Assistance, X 1  Increased posterior lean once in standing with RW, cues for correction with fair demo. Bed Mobility:  Supine to Sit: Stand By Assistance, X 1, with head of bed raised, with rail, with verbal cues , with increased time for completion to follow commands on task completion    Transfers:  Sit to Stand: Minimal Assistance, X 1, with increased time for completion, cues for hand placement, with verbal cues  Stand to Sit:Minimal Assistance, X 1, cues for hand placement, with verbal cues  RW for support, cues for safety with fair demo. Once pt in the chair, required max cues for scooting self back in chair for comfort, as pt kept asking Lizarraga Prima you talking to me. \"  Ambulation:  Minimal Assistance, X 1, with cues for safety, with verbal cues , with increased time for completion  Distance: 5 feet to chair  Surface: Level Tile  Device:Rolling Walker  Gait Deviations: Forward Flexed Posture, Slow Polina, Decreased Step Length Bilaterally, Decreased Gait Speed, Mild Path Deviations, Unsteady Gait, and Decreased Terminal Knee Extension  Pt required step by step cues for amb safely to chair, as pt walked slightly past chair and required assist for AD management to safely turn and take steps back to sit in chair. Functional Outcome Measures: Completed  AM-PAC Inpatient Mobility Raw Score : 15  AM-PAC Inpatient T-Scale Score : 39.45    ASSESSMENT:  Activity Tolerance:  Patient tolerance of  treatment: fair. Cognition and cues for task completion      Treatment Initiated: Treatment and education initiated within context of evaluation.   Evaluation time included review of current medical information, gathering information related to past medical, social and functional history, completion of standardized testing, formal and informal observation of tasks, assessment of data and development of plan of care and goals. Treatment time included skilled education and facilitation of tasks to increase safety and independence with functional mobility for improved independence and quality of life. Assessment: Body Structures, Functions, Activity Limitations Requiring Skilled Therapeutic Intervention: Decreased functional mobility , Decreased cognition, Decreased endurance, Decreased posture, Decreased balance, Decreased tolerance to work activity, Decreased strength, Decreased safe awareness, Decreased ADL status, Decreased coordination  Assessment: Jim Maldonado is a 74 y.o. male who presents with the deficits stated previously. Pt requires 1 person assist for functional tasks with use of walker for support. Pt cont to require skilled PT services to increase IND with functional tasks and progress towards PLOF to return to home environment safely. Therapy Prognosis: Fair    Requires PT Follow-Up: Yes    Discharge Recommendations:  Discharge Recommendations: Continue to assess pending progress, Subacute/Skilled Nursing Facility, Patient would benefit from continued therapy after discharge, 24 hour supervision or assist    Patient Education:      .     Patient Education  Education Given To: Patient  Education Provided: Role of Therapy, Plan of Care, Transfer Training, Equipment, Orientation  Education Method: Verbal  Education Outcome: Demonstrated understanding, Continued education needed       Equipment Recommendations:  Equipment Needed: No    Plan:  Current Treatment Recommendations: Strengthening, Balance training, Gait training, Functional mobility training, Transfer training, Neuromuscular re-education, Stair training, Endurance training, Equipment evaluation, education, & procurement, Patient/Caregiver education & training, Safety education & training, Therapeutic activities  General Plan:  (3-5x GM)    Goals:  Patient Goals : \"not stated\"  Short Term Goals  Time Frame for Short Term Goals: by discharge  Short Term Goal 1: Pt will demo supine to/from sit transfers with IND with bed flat to progress with mobility. Short Term Goal 2: Pt will demo si to/from stand transfers with RW for support with SBA to progress with mobillity. Short Term Goal 3: Pt will amb for 40 feet with RW and SBA for safety to progress with mobility. Short Term Goal 4: Pt will tolerate 10- 20 reps of ther ex to increase overall mobility with min cues. Short Term Goal 5: Pt will demo CGA for stair negotiation for steps in the home to progress with mobility. Long Term Goals  Time Frame for Long Term Goals : by discharge    Following session, patient left in safe position with all fall risk precautions in place. Pt in bedside chair following session, all needs and call light in reach, alarm on.

## 2023-01-17 PROBLEM — E43 SEVERE MALNUTRITION (HCC): Chronic | Status: ACTIVE | Noted: 2023-01-17

## 2023-01-17 LAB
ANION GAP SERPL CALCULATED.3IONS-SCNC: 10 MEQ/L (ref 8–16)
BUN BLDV-MCNC: 10 MG/DL (ref 7–22)
CALCIUM SERPL-MCNC: 8.6 MG/DL (ref 8.5–10.5)
CHLORIDE BLD-SCNC: 107 MEQ/L (ref 98–111)
CO2: 25 MEQ/L (ref 23–33)
CREAT SERPL-MCNC: 0.7 MG/DL (ref 0.4–1.2)
GFR SERPL CREATININE-BSD FRML MDRD: > 60 ML/MIN/1.73M2
GLUCOSE BLD-MCNC: 95 MG/DL (ref 70–108)
INFLUENZA A: NOT DETECTED
INFLUENZA B: NOT DETECTED
LV EF: 55 %
LVEF MODALITY: NORMAL
MAGNESIUM: 1.8 MG/DL (ref 1.6–2.4)
POTASSIUM REFLEX MAGNESIUM: 3.2 MEQ/L (ref 3.5–5.2)
SARS-COV-2 RNA, RT PCR: NOT DETECTED
SODIUM BLD-SCNC: 142 MEQ/L (ref 135–145)

## 2023-01-17 PROCEDURE — 87636 SARSCOV2 & INF A&B AMP PRB: CPT

## 2023-01-17 PROCEDURE — 83735 ASSAY OF MAGNESIUM: CPT

## 2023-01-17 PROCEDURE — 97530 THERAPEUTIC ACTIVITIES: CPT

## 2023-01-17 PROCEDURE — 2580000003 HC RX 258: Performed by: PHYSICIAN ASSISTANT

## 2023-01-17 PROCEDURE — 6370000000 HC RX 637 (ALT 250 FOR IP): Performed by: PHYSICIAN ASSISTANT

## 2023-01-17 PROCEDURE — 99233 SBSQ HOSP IP/OBS HIGH 50: CPT | Performed by: PHYSICIAN ASSISTANT

## 2023-01-17 PROCEDURE — 36415 COLL VENOUS BLD VENIPUNCTURE: CPT

## 2023-01-17 PROCEDURE — 6360000002 HC RX W HCPCS: Performed by: STUDENT IN AN ORGANIZED HEALTH CARE EDUCATION/TRAINING PROGRAM

## 2023-01-17 PROCEDURE — 1200000003 HC TELEMETRY R&B

## 2023-01-17 PROCEDURE — 80048 BASIC METABOLIC PNL TOTAL CA: CPT

## 2023-01-17 PROCEDURE — 6360000002 HC RX W HCPCS: Performed by: PHYSICIAN ASSISTANT

## 2023-01-17 PROCEDURE — 6370000000 HC RX 637 (ALT 250 FOR IP)

## 2023-01-17 PROCEDURE — 93306 TTE W/DOPPLER COMPLETE: CPT

## 2023-01-17 RX ORDER — FERROUS SULFATE 325(65) MG
325 TABLET ORAL
Status: DISCONTINUED | OUTPATIENT
Start: 2023-01-17 | End: 2023-01-18 | Stop reason: HOSPADM

## 2023-01-17 RX ORDER — POTASSIUM CHLORIDE 20 MEQ/1
40 TABLET, EXTENDED RELEASE ORAL
Status: DISCONTINUED | OUTPATIENT
Start: 2023-01-18 | End: 2023-01-18 | Stop reason: HOSPADM

## 2023-01-17 RX ORDER — 0.9 % SODIUM CHLORIDE 0.9 %
500 INTRAVENOUS SOLUTION INTRAVENOUS ONCE
Status: COMPLETED | OUTPATIENT
Start: 2023-01-17 | End: 2023-01-17

## 2023-01-17 RX ORDER — PHENOBARBITAL 32.4 MG/1
32.4 TABLET ORAL ONCE
Status: COMPLETED | OUTPATIENT
Start: 2023-01-17 | End: 2023-01-17

## 2023-01-17 RX ORDER — MIDODRINE HYDROCHLORIDE 5 MG/1
5 TABLET ORAL 2 TIMES DAILY WITH MEALS
Status: DISCONTINUED | OUTPATIENT
Start: 2023-01-17 | End: 2023-01-18 | Stop reason: HOSPADM

## 2023-01-17 RX ADMIN — MIDODRINE HYDROCHLORIDE 5 MG: 5 TABLET ORAL at 17:57

## 2023-01-17 RX ADMIN — SODIUM CHLORIDE 500 ML: 9 INJECTION, SOLUTION INTRAVENOUS at 09:03

## 2023-01-17 RX ADMIN — PHENOBARBITAL 32.4 MG: 32.4 TABLET ORAL at 20:45

## 2023-01-17 RX ADMIN — SODIUM CHLORIDE, PRESERVATIVE FREE 10 ML: 5 INJECTION INTRAVENOUS at 08:55

## 2023-01-17 RX ADMIN — ENOXAPARIN SODIUM 30 MG: 100 INJECTION SUBCUTANEOUS at 08:47

## 2023-01-17 RX ADMIN — POTASSIUM CHLORIDE 20 MEQ: 1500 TABLET, EXTENDED RELEASE ORAL at 08:42

## 2023-01-17 RX ADMIN — POTASSIUM CHLORIDE 40 MEQ: 1500 TABLET, EXTENDED RELEASE ORAL at 08:46

## 2023-01-17 RX ADMIN — SODIUM CHLORIDE, PRESERVATIVE FREE 10 ML: 5 INJECTION INTRAVENOUS at 20:45

## 2023-01-17 RX ADMIN — FERROUS SULFATE TAB 325 MG (65 MG ELEMENTAL FE) 325 MG: 325 (65 FE) TAB at 11:29

## 2023-01-17 RX ADMIN — THIAMINE HYDROCHLORIDE 100 MG: 100 INJECTION, SOLUTION INTRAMUSCULAR; INTRAVENOUS at 08:56

## 2023-01-17 NOTE — PROGRESS NOTES
Prayer and encouragement    01/17/23 1536   Encounter Summary   Service Provided For: Patient   Referral/Consult From: Jp   Last Encounter  01/17/23   Complexity of Encounter Low   Begin Time 1245   End Time  1250   Total Time Calculated 5 min   Spiritual/Emotional needs   Type Spiritual Support

## 2023-01-17 NOTE — PROGRESS NOTES
[address] RENAL TELEMETRY 6K  Occupational Therapy  Daily Note  Time:   Time In: 5657  Time Out: 8355  Timed Code Treatment Minutes: 10 Minutes  Minutes: 10          Date: 2023  Patient Name: Jasmyne Pelayo,   Gender: male      Room: [facility]001-A  MRN: [id number]  : 1955  (74 y.o.)  Referring Practitioner: Francisco Christiansen PA-C  Diagnosis: alcohol abuse  Additional Pertinent Hx: Per ER note on 2023: 74 y.o. male who presents to the emergency department from from home, brought in by EMS for evaluation of weakness and loss of appetite. Patient admits to past medical history of COPD and alcohol abuse. Patient presents to the emergency department with confusion, fatigue and loss of appetite. He states he was brought in by his friends and has not had anything to eat \"for a while, and feeling really weak. \"  He admits to alcohol abuse drinking 91-25 alcoholic beverages daily, last alcoholic beverage was 3-4 days ago. Patient admits to prior hospitalization for alcohol withdrawals, currently denies symptoms of alcohol withdrawal.    Restrictions/Precautions:  Restrictions/Precautions: General Precautions, Fall Risk  Position Activity Restriction  Other position/activity restrictions: monitor BP     SUBJECTIVE: Nurse approved Tx, pt supine, agreeable to Tx. PAIN: not stated    Vitals: Vitals not assessed per clinical judgement, see nursing flowsheet    COGNITION: Decreased Insight, Impaired Memory, and Impaired Attention    ADL:   No ADL's completed this session. Isa Olivarez BALANCE:  Sitting Balance:  Stand By Assistance, [employer]. EOB unsupported   Standing Balance: Contact Guard Assistance. With 2ww demo'd decreased stability and whole body tremors noted     BED MOBILITY:  Supine to Sit: Contact Guard Assistance, with head of bed raised, with rail, with increased time for completion      TRANSFERS:  Sit to Stand:  Contact Guard Assistance, Minimal Assistance.     Stand to Sit: Contact Guard Assistance. FUNCTIONAL MOBILITY:  Assistive Device: Rolling Walker  Assist Level:  Contact Guard Assistance and Minimal Assistance. Distance:  EOB to chair       ASSESSMENT:     Activity Tolerance:  Patient tolerance of  treatment: good. Discharge Recommendations: ECF with OT  Equipment Recommendations: Other: Monitor pending progress  Plan: Times Per Week: 3-5x  Current Treatment Recommendations: Functional mobility training, Balance training, Strengthening, Self-Care / ADL, Safety education & training, Endurance training    Patient Education  Patient Education: Role of OT, Plan of Care, and Importance of Increasing Activity     Goals  Short Term Goals  Time Frame for Short Term Goals: until discharge  Short Term Goal 1: Pt will complete various sit-stand t/fs including toilet with CGA & 0-2 vcs for safety  Short Term Goal 2: Pt will complete mobility to/from bathroom with RW, CGA, & 0-2 vcs for safety  Short Term Goal 3: Pt will tolerate standing 3-4 min with CGA for increased ease of sinkside grooming  Short Term Goal 4: Pt will complete BADL routine with min A & min vcs for safety/sequencing/completeness  Long Term Goals  Time Frame for Long Term Goals : No LTG set d/t short ELOS    Following session, patient left in safe position with all fall risk precautions in place.

## 2023-01-17 NOTE — CARE COORDINATION
1/17/23, 2:04 PM EST    DISCHARGE ON GOING [address] day: 3  Location: [facility]01/001-A Reason for admit: Alcohol abuse [F10.10]  Hyponatremia [D57.8]  Metabolic acidosis [H05.68]  Failure to thrive in adult [R62.7]  Protein-calorie malnutrition, unspecified severity ([facility] [address] [E46]   Barriers to Discharge: hypotension. Bolus given and midodrine started. . Echo ordered. Plan discharge tomorrow. PCP: No primary care provider on file.   Readmission Risk Score: 12.2%  Patient Goals/Plan/Treatment Preferences: sadia Carpio

## 2023-01-17 NOTE — PLAN OF CARE
Problem: Discharge Planning  Goal: Discharge to home or other facility with appropriate resources  1/17/2023 0026 by Damon Patterson RN  Outcome: Progressing  Flowsheets (Taken 1/17/2023 0026)  Discharge to home or other facility with appropriate resources:   Identify barriers to discharge with patient and caregiver   Arrange for needed discharge resources and transportation as appropriate   Identify discharge learning needs (meds, wound care, etc)   Arrange for interpreters to assist at discharge as needed   Refer to discharge planning if patient needs post-hospital services based on physician order or complex needs related to functional status, cognitive ability or social support system  1/16/2023 1320 by Adore Lawrence, MSW, LSW  Outcome: Progressing  Flowsheets (Taken 1/16/2023 1320)  Discharge to home or other facility with appropriate resources: Identify barriers to discharge with patient and caregiver  Note: Agreeable to a rehab stay, see SW notes 1/16/23.  1/16/2023 1307 by Kala Miller RN  Outcome: Progressing  Flowsheets (Taken 1/16/2023 0031 by Damon Patterson RN)  Discharge to home or other facility with appropriate resources:   Identify barriers to discharge with patient and caregiver   Arrange for needed discharge resources and transportation as appropriate   Identify discharge learning needs (meds, wound care, etc)   Arrange for interpreters to assist at discharge as needed   Refer to discharge planning if patient needs post-hospital services based on physician order or complex needs related to functional status, cognitive ability or social support system     Problem: Safety - Adult  Goal: Free from fall injury  1/17/2023 0026 by Damon Patterson RN  Outcome: Progressing  Flowsheets (Taken 1/17/2023 0026)  Free From Fall Injury:   Based on caregiver fall risk screen, instruct family/caregiver to ask for assistance with transferring infant if caregiver noted to have fall risk factors   Instruct family/caregiver on patient safety  1/16/2023 1307 by Guerda Humphries RN  Outcome: Progressing  Flowsheets (Taken 1/16/2023 1307)  Free From Fall Injury: Instruct family/caregiver on patient safety     Problem: ABCDS Injury Assessment  Goal: Absence of physical injury  1/17/2023 0026 by Brittney Barraza RN  Outcome: Progressing  Flowsheets (Taken 1/17/2023 0026)  Absence of Physical Injury: Implement safety measures based on patient assessment  1/16/2023 1307 by Guerda Humphries RN  Outcome: Progressing  Flowsheets (Taken 1/15/2023 1042 by Lisa Gamboa RN)  Absence of Physical Injury: Implement safety measures based on patient assessment     Problem: Skin/Tissue Integrity  Goal: Absence of new skin breakdown  Description: 1. Monitor for areas of redness and/or skin breakdown  2. Assess vascular access sites hourly  3. Every 4-6 hours minimum:  Change oxygen saturation probe site  4. Every 4-6 hours:  If on nasal continuous positive airway pressure, respiratory therapy assess nares and determine need for appliance change or resting period.   1/17/2023 0026 by Brittney Barraza RN  Outcome: Progressing  Note: Pt is frequently turned  1/16/2023 1307 by Guerda Humphries RN  Outcome: Progressing     Problem: Skin/Tissue Integrity - Adult  Goal: Skin integrity remains intact  1/17/2023 0026 by Brittney Barraza RN  Outcome: Progressing  Flowsheets (Taken 1/16/2023 0031)  Skin Integrity Remains Intact:   Monitor for areas of redness and/or skin breakdown   Assess vascular access sites hourly  1/16/2023 1307 by Guerda Humphries RN  Outcome: Progressing  Flowsheets (Taken 1/16/2023 0031 by Brittney Barraza RN)  Skin Integrity Remains Intact:   Monitor for areas of redness and/or skin breakdown   Assess vascular access sites hourly  Goal: Incisions, wounds, or drain sites healing without S/S of infection  1/17/2023 0026 by Brittney Barraza RN  Outcome: Progressing  Flowsheets (Taken 1/16/2023 0031)  Incisions, Wounds, or Drain Sites Healing Without Sign and Symptoms of Infection: ADMISSION and DAILY: Assess and document risk factors for pressure ulcer development  1/16/2023 1307 by Angie Martinez RN  Outcome: Progressing  [address] (Taken 1/16/2023 0031 by Ara Mccoy RN)  Incisions, Wounds, or Drain Sites Healing Without Sign and Symptoms of Infection: ADMISSION and DAILY: Assess and document risk factors for pressure ulcer development  Goal: Oral mucous membranes remain intact  1/17/2023 0026 by Ara Mccoy RN  Outcome: Progressing  1/16/2023 1307 by Angie Martinez RN  Outcome: Progressing  Flowsheets (Taken 1/16/2023 1307)  Oral Mucous Membranes Remain Intact: Assess oral mucosa and hygiene practices

## 2023-01-17 NOTE — FLOWSHEET NOTE
01/17/23 1021   Safe Environment   Safety Measures Other (comment)  (VN safety round , call placed to pt room answered by staff stated there was already staff in the room , no needs at this time)

## 2023-01-17 NOTE — PLAN OF CARE
Problem: Discharge Planning  Goal: Discharge to home or other facility with appropriate resources  1/17/2023 1152 by Hollis Tineo RN  Outcome: Progressing  Flowsheets (Taken 1/17/2023 1152)  Discharge to home or other facility with appropriate resources: Identify barriers to discharge with patient and caregiver  1/17/2023 0026 by Wale Arellano RN  Outcome: Progressing  Flowsheets (Taken 1/17/2023 0026)  Discharge to home or other facility with appropriate resources:   Identify barriers to discharge with patient and caregiver   Arrange for needed discharge resources and transportation as appropriate   Identify discharge learning needs (meds, wound care, etc)   Arrange for interpreters to assist at discharge as needed   Refer to discharge planning if patient needs post-hospital services based on physician order or complex needs related to functional status, cognitive ability or social support system     Problem: Safety - Adult  Goal: Free from fall injury  1/17/2023 1152 by Hollis Tineo RN  Outcome: Progressing  Flowsheets (Taken 1/17/2023 1152)  Free From Fall Injury: Instruct family/caregiver on patient safety  1/17/2023 0026 by Wale Arellano RN  Outcome: Progressing  Flowsheets (Taken 1/17/2023 0026)  Free From Fall Injury:   Based on caregiver fall risk screen, instruct family/caregiver to ask for assistance with transferring infant if caregiver noted to have fall risk factors   Instruct family/caregiver on patient safety     Problem: ABCDS Injury Assessment  Goal: Absence of physical injury  1/17/2023 1152 by Hollis Tineo RN  Outcome: Progressing  1/17/2023 0026 by Wale Arellano RN  Outcome: Progressing  Flowsheets (Taken 1/17/2023 0026)  Absence of Physical Injury: Implement safety measures based on patient assessment     Problem: Chronic Conditions and Co-morbidities  Goal: Patient's chronic conditions and co-morbidity symptoms are monitored and maintained or improved  1/17/2023 1152 by Hollis Tineo RN  Outcome: Progressing  Flowsheets (Taken 1/17/2023 1152)  Care Plan - Patient's Chronic Conditions and Co-Morbidity Symptoms are Monitored and Maintained or Improved:   Monitor and assess patient's chronic conditions and comorbid symptoms for stability, deterioration, or improvement   Collaborate with multidisciplinary team to address chronic and comorbid conditions and prevent exacerbation or deterioration   Update acute care plan with appropriate goals if chronic or comorbid symptoms are exacerbated and prevent overall improvement and discharge  1/17/2023 0026 by Brittney Barraza RN  Outcome: Progressing  Flowsheets (Taken 1/16/2023 0031)  Care Plan - Patient's Chronic Conditions and Co-Morbidity Symptoms are Monitored and Maintained or Improved:   Monitor and assess patient's chronic conditions and comorbid symptoms for stability, deterioration, or improvement   Collaborate with multidisciplinary team to address chronic and comorbid conditions and prevent exacerbation or deterioration   Update acute care plan with appropriate goals if chronic or comorbid symptoms are exacerbated and prevent overall improvement and discharge     Problem: Skin/Tissue Integrity  Goal: Absence of new skin breakdown  Description: 1. Monitor for areas of redness and/or skin breakdown  2. Assess vascular access sites hourly  3. Every 4-6 hours minimum:  Change oxygen saturation probe site  4. Every 4-6 hours:  If on nasal continuous positive airway pressure, respiratory therapy assess nares and determine need for appliance change or resting period.   1/17/2023 1152 by Lis Butt RN  Outcome: Progressing  1/17/2023 0026 by Brittney Barraza RN  Outcome: Progressing  Note: Pt is frequently turned     Problem: Respiratory - Adult  Goal: Achieves optimal ventilation and oxygenation  1/17/2023 1152 by Lis Butt RN  Outcome: Progressing  1/17/2023 0026 by Brittney Barraza RN  Outcome: Progressing  Flowsheets (Taken 1/17/2023 0026)  Achieves optimal ventilation and oxygenation:   Assess for changes in mentation and behavior   Assess for changes in respiratory status     Problem: Skin/Tissue Integrity - Adult  Goal: Skin integrity remains intact  1/17/2023 1152 by Dom Schultz RN  Outcome: Progressing  1/17/2023 0026 by Gaurav Ortiz RN  Outcome: Progressing  Flowsheets (Taken 1/16/2023 0031)  Skin Integrity Remains Intact:   Monitor for areas of redness and/or skin breakdown   Assess vascular access sites hourly  Goal: Incisions, wounds, or drain sites healing without S/S of infection  1/17/2023 1152 by Dom Schultz RN  Outcome: Progressing  1/17/2023 0026 by Gaurav Ortiz RN  Outcome: Progressing  Flowsheets (Taken 1/16/2023 0031)  Incisions, Wounds, or Drain Sites Healing Without Sign and Symptoms of Infection: ADMISSION and DAILY: Assess and document risk factors for pressure ulcer development  Goal: Oral mucous membranes remain intact  1/17/2023 1152 by Dom Schultz RN  Outcome: Progressing  1/17/2023 0026 by Gaurav Ortiz RN  Outcome: Progressing     Problem: Metabolic/Fluid and Electrolytes - Adult  Goal: Electrolytes maintained within normal limits  1/17/2023 1152 by Dom Schultz RN  Outcome: Progressing  1/17/2023 0026 by Gaurav Ortiz RN  Outcome: Progressing  Flowsheets (Taken 1/17/2023 0026)  Electrolytes maintained within normal limits:   Administer electrolyte replacement as ordered   Monitor labs and assess patient for signs and symptoms of electrolyte imbalances  Goal: Hemodynamic stability and optimal renal function maintained  1/17/2023 1152 by Dom Schultz RN  Outcome: Progressing  1/17/2023 0026 by Gaurav Ortiz RN  Outcome: Progressing  Flowsheets (Taken 1/17/2023 0026)  Hemodynamic stability and optimal renal function maintained:   Monitor intake, output and patient weight   Monitor labs and assess for signs and symptoms of volume excess or deficit  Goal: Glucose maintained within prescribed range  1/17/2023 1152 by Dom Schultz RN  Outcome: Progressing  1/17/2023 0026 by Jez Tripp, RN  Outcome: Progressing  Flowsheets (Taken 1/17/2023 0026)  Glucose maintained within prescribed range:   Administer ordered medications to maintain glucose within target range   Assess for signs and symptoms of hyperglycemia and hypoglycemia   Monitor blood glucose as ordered     Problem: Nutrition Deficit:  Goal: Optimize nutritional status  Outcome: Progressing

## 2023-01-17 NOTE — PROGRESS NOTES
Hospitalist Progress Note    Patient:  Ludwig Arevalo      Unit/Bed:6K-01/001-A    YOB: 1955    MRN: [id number]       Acct: [de-identified]     PCP: No primary care provider on file. Date of Admission: 1/14/2023    Assessment/Plan:    Failure to thrive in adult:   CXR - Emphysematous changes predominantly within the upper lung zones. Urinalysis pending   Vit. B12, Folate level normal,   Thiamine pending  Consider Pulmonology consult and PFT for COPD evaluation- recommend outpatient as patient currently asymptomatic and not requiring any supplemental O2   PT/ OT consulted- recommend SNF and 24 hour supervision- SW for placement  Dietician consulted   Triglycerides and prealbumin low consistent with malnutrition  Hypotension  500cc bolus ordered - responding well to fluids   Echo ordered  Start midodrine 5mg BID   Encourage PO intake   Hypokalemia  Mild at 3.2  Increase Kcl to 40 meq BID  BMP daily   Hyponatremia- resolved  Suspected etiology beer potomania  130 on arrival, currently 142  BMP daily  Alcohol Abuse: Thiamine injection- thiamine level pending  Ammonia normal, ETOH negative   EKG - NSR   Phenobarbital prophylaxis protocol- ending 1/17    Iron deficiency anemia- suspect secondary to malnutrition   Mixed lab picture given elevated ferritin, low TIBC   Start ferrous sulfate  Referral on DC for colonoscopy  TIBC low secondary to malnutrition    HAGMA-resolved  Lactic acid 2.1 initially- normal now   Tobacco Abuse  According to daughters, 3 packs per day chronically  Nicotine patch ordered.    Metabolic encephalopathy secondary to chronic alcohol abuse and starvation - improving  Cognitive evaluation completed  Continue with treatment as above  BMP daily   Transaminasemia secondary to Alcohol Fatty Liver Disease  AST 93, ALT 88, Alk Phos 154  US Abdomen demonstrating fatty liver   Lipase, bilirubin normal   Hepatitis panel negative      Expected discharge date:  pending course    Disposition:    [] Home       [] TCU       [] Rehab       [] Psych       [] SNF       [] Cristy       [x] Unknown     Chief Complaint: confusion, weight loss     Hospital Course:   Initial HPI  \" Patient is a 74 y.o. male with a history of alcohol abuse who presents to the ED with complaints of increased fatigue and loss of appetite. Patient's friends reports he has not eaten anything in a week, only a couple bites everyday, has been able to drink some water, normally drinks alcohol daily (12+ beer/day), but hasn't in 3-4 days. He has been losing weight over the last 6-8 weeks. Patient is oriented to his name and birthday, but is not oriented to the place, date, or time. Patient not meaningfully able to contribute to this HPI. Patient denies any drug use. Patient is being admitted for further evaluation and workup of FTT and treatment of alcohol dependence. Patient is poor independent historian. 1/15/2023  Patient able to tell me his name, . Is not oriented outside of this. Unable to answer any additional questions. Able to follow basic commands     2023  Patient is more alert today- orientation is still impaired  Daughters are at bedside  BP's are soft- Bolus of fluids ordered to allow for more room for phenobarb protocol. PT recommending SNF - Daughters agree     2023  Patient with persistent hypotension, worse this AM.  Continuous fluids were stopped overnight as IV access was lost.  Will obtain echo and give 500cc bolus. Patient is asymptomatic.   Start midodrine   Plan for DC tomorrow if BP stable         Medications:  Reviewed    Infusion Medications    sodium chloride       Scheduled Medications    sodium chloride  500 mL IntraVENous Once    [START ON 2023] potassium chloride  40 mEq Oral Daily with breakfast    nicotine  1 patch TransDERmal Daily    PHENobarbital  32.4 mg Oral BID    enoxaparin  30 mg SubCUTAneous Daily    thiamine  100 mg IntraVENous Daily    sodium chloride flush  5-40 mL IntraVENous 2 times per day     PRN Meds: melatonin, sodium chloride flush, sodium chloride, ondansetron **OR** ondansetron, polyethylene glycol, acetaminophen **OR** acetaminophen, potassium chloride **OR** potassium alternative oral replacement **OR** potassium chloride, magnesium sulfate      Intake/Output Summary (Last 24 hours) at 1/17/2023 0913  Last data filed at 1/16/2023 1230  Gross per 24 hour   Intake 500 ml   Output --   Net 500 ml         Diet:  ADULT DIET; Regular  ADULT ORAL NUTRITION SUPPLEMENT; Lunch; Standard High Calorie/High Protein Oral Supplement    Exam:  BP (!) 78/56   Pulse 65   Temp 98.2 °F (36.8 °C) (Oral)   Resp 18   Ht 5' 6\" (1.676 m) Comment: pt stated his ht. Wt 106 lb 14.4 oz (48.5 kg)   SpO2 95%   BMI 17.25 kg/m²     General appearance: No apparent distress, appears stated age and cooperative. He appears thin, unkempt, malnourished. HEENT: Pupils equal, round, and reactive to light. Conjunctivae/corneas clear. Neck: Supple, with full range of motion. No jugular venous distention. Trachea midline. Respiratory:  Normal respiratory effort. Clear to auscultation, bilaterally without Rales/Wheezes/Rhonchi. Cardiovascular: Regular rate and rhythm with normal S1/S2 without murmurs, rubs or gallops. Abdomen: Soft, non-tender, non-distended with normal bowel sounds. Musculoskeletal: passive and active ROM x 4 extremities. Skin: Skin color, texture, turgor normal.  No rashes or lesions. Neurologic:  Neurovascularly intact without any focal sensory/motor deficits. Cranial nerves: II-XII intact, grossly non-focal. No asterixis   Psychiatric: Alert and oriented x 2- orientation and mentation improved 1/17.  Impaired insight - Improved 1/17  Capillary Refill: Brisk,< 3 seconds   Peripheral Pulses: +2 palpable, equal bilaterally       Labs:   Recent Labs     01/14/23  1510 01/15/23  0704   WBC 7.5 6.5   HGB 13.2* 10.5*   HCT 37.8* 30.9*    226       Recent Labs     01/15/23  0704 01/16/23  0627 01/17/23  0611    140 142   K 3.6 3.4* 3.2*    102 107   CO2 22* 22* 25   BUN 19 13 10   CREATININE 0.8 0.7 0.7   CALCIUM 8.5 9.0 8.6       Recent Labs     01/14/23  1629   AST 93*   ALT 88*   BILIDIR <0.2   BILITOT 0.4   ALKPHOS 154*       Recent Labs     01/14/23  1629   INR 0.97       Recent Labs     01/14/23  1629   CKTOTAL 17*         Microbiology:      Urinalysis:      Lab Results   Component Value Date/Time    NITRU NEGATIVE 05/16/2013 08:30 PM    WBCUA 0-2 05/16/2013 08:30 PM    BACTERIA NONE 05/16/2013 08:30 PM    RBCUA 0-2 05/16/2013 08:30 PM    BLOODU NEGATIVE 05/16/2013 08:30 PM    SPECGRAV 1.010 05/16/2013 08:30 PM       Radiology:  XR CHEST PORTABLE    Result Date: 1/14/2023  PROCEDURE: XR CHEST PORTABLE CLINICAL INFORMATION: SOB COMPARISON: 5/16/2013 TECHNIQUE:  AP mobile chest single view  FINDINGS: Calcified lymph nodes are again seen at the right hilum. The heart size appears normal. There are suggested upper zone predominant emphysematous changes. No pleural effusion is seen. No focal consolidation is demonstrated. No acute osseous findings are seen. 1. Emphysematous changes predominantly within the upper lung zones are seen. No other acute cardiopulmonary findings are otherwise demonstrated. **This report has been created using voice recognition software. It may contain minor errors which are inherent in voice recognition technology. ** Final report electronically signed by Dr. Josh Hsieh on 1/14/2023 4:00 PM      DVT prophylaxis: [x] Lovenox                                 [] SCDs                                 [] SQ Heparin                                 [] Encourage ambulation           [] Already on Anticoagulation     Code Status: Full Code    PT/OT Eval Status: following    Tele:   [] yes             [x] no    Active Hospital Problems    Diagnosis Date Noted    Hyponatremia [E87.1] 01/14/2023 Priority: Medium       Electronically signed by Clarisse Montana PA-C on 1/17/2023 at 9:13 AM

## 2023-01-17 NOTE — DISCHARGE INSTR - COC
Continuity of Care Form    Patient Name: Flako Merrill   :  1955  MRN:  [id number]    Admit date:  2023  Discharge date:  23    Code Status Order: Full Code   Advance Directives:     Admitting Physician:  Juancarlos Mccann MD  PCP: No primary care provider on file.    Discharging Nurse: Gertrude ADAMS  Discharging Hospital Unit/Room#: 6K-01/001-A  Discharging Unit Phone Number: [phone]    Emergency Contact:   Extended Emergency Contact Information  Primary Emergency Contact: Kellie Montemayor   [facility]  Home Phone: [phone]  Relation: Child  Secondary Emergency Contact: Mercedes Conn  Home Phone: [phone]  Relation: Domestic Partner   needed? No    Past Surgical History:  History reviewed. No pertinent surgical history.    Immunization History:     There is no immunization history on file for this patient.    Active Problems:  Patient Active Problem List   Diagnosis Code    Syncope R55    Hyponatremia E87.1       Isolation/Infection:   Isolation            No Isolation          Patient Infection Status       Infection Onset Added Last Indicated Last Indicated By Review Planned Expiration Resolved Resolved By    None active    Resolved    COVID-19 (Rule Out) 23 COVID-19 & Influenza Combo (Ordered)   23 Rule-Out Test Resulted            Nurse Assessment:  Last Vital Signs: BP 99/77   Pulse 65   Temp 97.5 °F (36.4 °C) (Oral)   Resp (!) 118   Ht 5' 6\" (1.676 m) Comment: pt stated his ht.  Wt 106 lb 14.4 oz (48.5 kg)   SpO2 100%   BMI 17.25 kg/m²     Last documented pain score (0-10 scale): Pain Level: 4  Last Weight:   Wt Readings from Last 1 Encounters:   01/15/23 106 lb 14.4 oz (48.5 kg)     Mental Status:  disoriented and alert oriented to self, sometimes place    IV Access:  - None    Nursing Mobility/ADLs:  Walking   Assisted  Transfer  Assisted  Bathing  Assisted  Dressing  Assisted  Toileting  Assisted  Feeding  Independent  Med  [facility] Delivery   none    Wound Care Documentation and Therapy:        Elimination:  Continence: Bowel: Yes  Bladder: No  Urinary Catheter: None   Colostomy/Ileostomy/Ileal Conduit: No       Date of Last BM: 1/18/23    Intake/Output Summary (Last 24 hours) at 1/17/2023 0757  Last data filed at 1/16/2023 1230  Gross per 24 hour   Intake 500 ml   Output --   Net 500 ml     I/O last 3 completed shifts: In: 500 [P.O.:500]  Out: -     Safety Concerns: At Risk for Falls    Impairments/Disabilities:      None    Nutrition Therapy:  Current Nutrition Therapy:   - Oral Diet:  General    Routes of Feeding: Oral  Liquids: Thin Liquids  Daily Fluid Restriction: no  Last Modified Barium Swallow with Video (Video Swallowing Test): not done    Treatments at the Time of Hospital Discharge:   Respiratory Treatments: none  Oxygen Therapy:  is not on home oxygen therapy.   Ventilator:    - No ventilator support    Rehab Therapies: Physical Therapy and Occupational Therapy  Weight Bearing Status/Restrictions: No weight bearing restrictions  Other Medical Equipment (for information only, NOT a DME order):  walker  Other Treatments: none    Patient's personal belongings (please select all that are sent with patient):  clothing    RN SIGNATURE:  Electronically signed by Jennifer Kumar RN on 1/18/23 at 10:37 AM EST    CASE MANAGEMENT/SOCIAL WORK SECTION    Inpatient Status Date: 1/14/23    Readmission Risk Assessment Score:  Readmission Risk              Risk of Unplanned Readmission:  11           Discharging to Facility/ Agency   Name: [facility]  Address:   [facility].  [facility]    [phone]         Phone:  Fax:  [phone]    Dialysis Facility (if applicable)   Name:  Address:  Dialysis Schedule:  Phone:  Fax:    / signature: Electronically signed by NORMA Trujillo, LSW on 1/17/23 at 7:58 AM EST    PHYSICIAN SECTION    Prognosis: Good    Condition at Discharge: Stable    Rehab Potential (if transferring to Rehab): Good    Recommended Labs or Other Treatments After Discharge: PT/OT. Repeat labs in 1 week. Dietician to follow. Physician Certification: I certify the above information and transfer of Raad Shell  is necessary for the continuing treatment of the diagnosis listed and that he requires [facility] for greater 30 days.      Update Admission H&P: No change in H&P    PHYSICIAN SIGNATURE:  Electronically signed by ALEJANDRA Marrufo CNP on 1/18/23 at 11:37 AM EST

## 2023-01-17 NOTE — PROGRESS NOTES
This nurse updated the doctor of patient's condition. 1/17/23 4:23 AM  [phone] From: Aleah Herman RN [facility] 6A RE: Kate More Pt had pulled IV out on day shift on 1/16. Resource nurse was pulled to the floor for night shift. I just wanted to update you that I will contact IV team prior to leaving in the morning for IV placement. I did attempt myself several times. Also pt is refusing to wear the tele.   Read 4:23 AM         1/17/23 4:23 AM   Thanks

## 2023-01-17 NOTE — PROGRESS NOTES
Comprehensive Nutrition Assessment    Type and Reason for Visit:  Initial, Consult (poor oral intake)    Nutrition Recommendations/Plan:   Recommend consider MVI and folvite. Continue thiamin supplementation. ONS increased to BID. Continue current diet. Malnutrition Assessment:  Malnutrition Status:  Severe malnutrition (01/17/23 1141)    Context:  Chronic Illness     Findings of the 6 clinical characteristics of malnutrition:  Energy Intake:  75% or less estimated energy requirements for 1 month or longer  Weight Loss:   (No EMR weights in the last year to verify, note 31.5% loss since 5/17/2003)     Body Fat Loss:  Severe body fat loss Orbital   Muscle Mass Loss:  Severe muscle mass loss Clavicles (pectoralis & deltoids), Calf (gastrocnemius), Thigh (quadraceps)  Fluid Accumulation:  No significant fluid accumulation     Strength:  Not Performed    Nutrition Assessment:     Pt. severely malnourished AEB criteria listed above. At risk for further nutritional compromise r/t admit with alcohol abuse, cognitive deficits, low BMI,  and underlying medical condition (hx: smoking, alcohol abuse). Nutrition Related Findings:    Pt. Report/Treatments/Miscellaneous: Patient seen, reports his appetite is doing okay, however eating 25% or less of meals here. When asked what he typically eats at home: Breakfast-2-3 cookies, maybe some cake. Lunch: some tootsie rolls, Dinner: maybe a sub. Very poor historian. Has lost weight unplanned. Agreed  to ONS. SLP following for cognitive deficits. GI Status: denied issues, BM 1/16/23  Pertinent Labs: 1/14/23: Alk Phos 154, ALT 88, AST 93.  1/16/23: Sodium 140, Potassium 3.4, BUN 13, Creatinine 0.7, Mg 2.2, Glucose 91  Pertinent Meds: iron, thiamin     Wound Type: None       Current Nutrition Intake & Therapies:    Average Meal Intake: 0%, 1-25%     ADULT DIET;  Regular  ADULT ORAL NUTRITION SUPPLEMENT; Breakfast, Dinner; Standard High Calorie/High Protein Oral Supplement    Anthropometric Measures:  Height: 5' 6\" (167.6 cm) (pt stated his ht.)  Ideal Body Weight (IBW): 142 lbs (65 kg)    Admission Body Weight: 148 lb (67.1 kg) (1/14/23 ? stated)  Current Body Weight: 106 lb 14.4 oz (48.5 kg) (1/15/23 standing no edema),      Current BMI (kg/m2): 17.3  Usual Body Weight:  (~ 140# per pt. No EMR weights in last year. 5/17/13: 156#1. 6oz)                       BMI Categories: Normal Weight (BMI 18.5-24. 9)    Estimated Daily Nutrient Needs:  Energy Requirements Based On: Kcal/kg  Weight Used for Energy Requirements:  (48.5 kg)  Energy (kcal/day): ~ 5771-5947 grams (30-35 kcals/kg/day)  Weight Used for Protein Requirements:  (48.5 kg)  Protein (g/day): ~73 grams (1.5 grams/kg/day) or more         Nutrition Diagnosis:   Severe malnutrition, In context of chronic illness related to inadequate protein-energy intake as evidenced by Criteria as identified in malnutrition assessment    Nutrition Interventions:   Food and/or Nutrient Delivery: Continue Current Diet, Modify Oral Nutrition Supplement  Nutrition Education/Counseling: Education initiated (Encouraged oral intake and ONS use.)  Coordination of Nutrition Care: Continue to monitor while inpatient       Goals:     Goals: PO intake 75% or greater, by next RD assessment       Nutrition Monitoring and Evaluation:      Food/Nutrient Intake Outcomes: Food and Nutrient Intake, Supplement Intake  Physical Signs/Symptoms Outcomes: Biochemical Data, GI Status, Fluid Status or Edema, Meal Time Behavior, Nutrition Focused Physical Findings, Weight    Discharge Planning:     Too soon to determine     Gertrude Cramer RD, LD  Contact: [phone]

## 2023-01-18 VITALS
HEART RATE: 103 BPM | BODY MASS INDEX: 17.18 KG/M2 | OXYGEN SATURATION: 99 % | TEMPERATURE: 97.5 F | DIASTOLIC BLOOD PRESSURE: 104 MMHG | WEIGHT: 106.9 LBS | RESPIRATION RATE: 18 BRPM | SYSTOLIC BLOOD PRESSURE: 116 MMHG | HEIGHT: 66 IN

## 2023-01-18 LAB
ANION GAP SERPL CALCULATED.3IONS-SCNC: 10 MEQ/L (ref 8–16)
BUN BLDV-MCNC: 7 MG/DL (ref 7–22)
CALCIUM SERPL-MCNC: 8.1 MG/DL (ref 8.5–10.5)
CHLORIDE BLD-SCNC: 105 MEQ/L (ref 98–111)
CO2: 24 MEQ/L (ref 23–33)
CREAT SERPL-MCNC: 0.7 MG/DL (ref 0.4–1.2)
GFR SERPL CREATININE-BSD FRML MDRD: > 60 ML/MIN/1.73M2
GLUCOSE BLD-MCNC: 96 MG/DL (ref 70–108)
MAGNESIUM: 1.4 MG/DL (ref 1.6–2.4)
POTASSIUM REFLEX MAGNESIUM: 3.5 MEQ/L (ref 3.5–5.2)
SODIUM BLD-SCNC: 139 MEQ/L (ref 135–145)

## 2023-01-18 PROCEDURE — 80048 BASIC METABOLIC PNL TOTAL CA: CPT

## 2023-01-18 PROCEDURE — 36415 COLL VENOUS BLD VENIPUNCTURE: CPT

## 2023-01-18 PROCEDURE — 83735 ASSAY OF MAGNESIUM: CPT

## 2023-01-18 PROCEDURE — 6360000002 HC RX W HCPCS: Performed by: PHYSICIAN ASSISTANT

## 2023-01-18 PROCEDURE — 99239 HOSP IP/OBS DSCHRG MGMT >30: CPT | Performed by: NURSE PRACTITIONER

## 2023-01-18 PROCEDURE — 6370000000 HC RX 637 (ALT 250 FOR IP): Performed by: PHYSICIAN ASSISTANT

## 2023-01-18 RX ORDER — THIAMINE MONONITRATE (VIT B1) 100 MG
100 TABLET ORAL DAILY
Qty: 30 TABLET | Refills: 0
Start: 2023-01-18

## 2023-01-18 RX ORDER — LANOLIN ALCOHOL/MO/W.PET/CERES
400 CREAM (GRAM) TOPICAL DAILY
Status: DISCONTINUED | OUTPATIENT
Start: 2023-01-18 | End: 2023-01-18 | Stop reason: HOSPADM

## 2023-01-18 RX ORDER — LANOLIN ALCOHOL/MO/W.PET/CERES
3 CREAM (GRAM) TOPICAL NIGHTLY PRN
Qty: 30 TABLET | Refills: 0
Start: 2023-01-18

## 2023-01-18 RX ORDER — MULTIVIT-MIN/IRON FUM/FOLIC AC 7.5 MG-4
1 TABLET ORAL DAILY
Qty: 30 TABLET | Refills: 0
Start: 2023-01-18

## 2023-01-18 RX ORDER — FERROUS SULFATE 325(65) MG
325 TABLET ORAL
Qty: 30 TABLET | Refills: 0
Start: 2023-01-19

## 2023-01-18 RX ORDER — POLYETHYLENE GLYCOL 3350 17 G/17G
17 POWDER, FOR SOLUTION ORAL DAILY PRN
Qty: 527 G | Refills: 0
Start: 2023-01-18 | End: 2023-02-17

## 2023-01-18 RX ORDER — NICOTINE 21 MG/24HR
1 PATCH, TRANSDERMAL 24 HOURS TRANSDERMAL DAILY
Qty: 30 PATCH | Refills: 0
Start: 2023-01-19

## 2023-01-18 RX ORDER — FOLIC ACID 1 MG/1
1 TABLET ORAL DAILY
Qty: 90 TABLET | Refills: 0
Start: 2023-01-18

## 2023-01-18 RX ADMIN — POTASSIUM CHLORIDE 40 MEQ: 1500 TABLET, EXTENDED RELEASE ORAL at 08:49

## 2023-01-18 RX ADMIN — FERROUS SULFATE TAB 325 MG (65 MG ELEMENTAL FE) 325 MG: 325 (65 FE) TAB at 08:44

## 2023-01-18 RX ADMIN — ENOXAPARIN SODIUM 30 MG: 100 INJECTION SUBCUTANEOUS at 08:37

## 2023-01-18 NOTE — PLAN OF CARE
Problem: Discharge Planning  Goal: Discharge to home or other facility with appropriate resources  1/18/2023 1313 by Denise Buitrago RN  Outcome: Completed  1/18/2023 0918 by Denise Buitrago RN  Outcome: Progressing  Flowsheets (Taken 1/17/2023 1152)  Discharge to home or other facility with appropriate resources: Identify barriers to discharge with patient and caregiver     Problem: Safety - Adult  Goal: Free from fall injury  1/18/2023 1313 by Denise Buitrago RN  Outcome: Completed  1/18/2023 0918 by Denise Buitrago RN  Outcome: Progressing     Problem: ABCDS Injury Assessment  Goal: Absence of physical injury  1/18/2023 1313 by Denise Buitrago RN  Outcome: Completed  1/18/2023 0918 by Denise Buitrago RN  Outcome: Deysi Ritchie (Taken 1/17/2023 0026 by Aleah Herman RN)  Absence of Physical Injury: Implement safety measures based on patient assessment     Problem: Chronic Conditions and Co-morbidities  Goal: Patient's chronic conditions and co-morbidity symptoms are monitored and maintained or improved  1/18/2023 1313 by Denise Buitrago RN  Outcome: Completed  1/18/2023 0918 by Denise Buirtago RN  Outcome: Progressing     Problem: Skin/Tissue Integrity  Goal: Absence of new skin breakdown  Description: 1. Monitor for areas of redness and/or skin breakdown  2. Assess vascular access sites hourly  3. Every 4-6 hours minimum:  Change oxygen saturation probe site  4. Every 4-6 hours:  If on nasal continuous positive airway pressure, respiratory therapy assess nares and determine need for appliance change or resting period.   1/18/2023 1313 by Denise Buitrago RN  Outcome: Completed  1/18/2023 0918 by Denise Buitrago RN  Outcome: Progressing     Problem: Respiratory - Adult  Goal: Achieves optimal ventilation and oxygenation  1/18/2023 1313 by Denise Buitrago RN  Outcome: Completed  1/18/2023 0918 by Denise Buitrago RN  Outcome: Progressing     Problem: Skin/Tissue Integrity - Adult  Goal: Skin integrity remains intact  1/18/2023 1313 by Tina Christie RN  Outcome: Completed  1/18/2023 0918 by Tina Christie RN  Outcome: Progressing  Goal: Incisions, wounds, or drain sites healing without S/S of infection  1/18/2023 1313 by Tina Christie RN  Outcome: Completed  1/18/2023 0918 by Tina Christie RN  Outcome: Progressing  Goal: Oral mucous membranes remain intact  1/18/2023 1313 by Tina Christie RN  Outcome: Completed  1/18/2023 0918 by Tina Chritsie RN  Outcome: Progressing     Problem: Metabolic/Fluid and Electrolytes - Adult  Goal: Electrolytes maintained within normal limits  1/18/2023 1313 by Tina Christie RN  Outcome: Completed  1/18/2023 0918 by Tina Christie RN  Outcome: Progressing  Goal: Hemodynamic stability and optimal renal function maintained  1/18/2023 1313 by Tina Christie RN  Outcome: Completed  1/18/2023 0918 by Tina Christie RN  Outcome: Progressing  Goal: Glucose maintained within prescribed range  1/18/2023 1313 by Tina Christie RN  Outcome: Completed  1/18/2023 0918 by Tina Christie RN  Outcome: Progressing     Problem: Nutrition Deficit:  Goal: Optimize nutritional status  1/18/2023 1313 by Tina Christie RN  Outcome: Completed  1/18/2023 0918 by Tina Christie RN  Outcome: Progressing

## 2023-01-18 NOTE — PLAN OF CARE
Problem: Discharge Planning  Goal: Discharge to home or other facility with appropriate resources  1/18/2023 0918 by Leanne Lee RN  Outcome: Progressing  Flowsheets (Taken 1/17/2023 1152)  Discharge to home or other facility with appropriate resources: Identify barriers to discharge with patient and caregiver  1/17/2023 2224 by Mala Wayne RN  Outcome: Iniguez Elaine (Taken 1/17/2023 1152 by Leanne Lee RN)  Discharge to home or other facility with appropriate resources: Identify barriers to discharge with patient and caregiver     Problem: Safety - Adult  Goal: Free from fall injury  1/18/2023 0918 by Leanne Lee RN  Outcome: Progressing  1/17/2023 2224 by Mala Wayne RN  Outcome: Progressing  Flowsheets (Taken 1/17/2023 1152 by Leanne Lee RN)  Free From Fall Injury: Instruct family/caregiver on patient safety     Problem: ABCDS Injury Assessment  Goal: Absence of physical injury  1/18/2023 0918 by Leanne Lee RN  Outcome: Hira Henry (Taken 1/17/2023 0026 by Lisa Yanez RN)  Absence of Physical Injury: Implement safety measures based on patient assessment  1/17/2023 2224 by Mala Wayne RN  Outcome: Progressing  Flowsheets (Taken 1/17/2023 0026 by Lisa Yanez, RN)  Absence of Physical Injury: Implement safety measures based on patient assessment     Problem: Chronic Conditions and Co-morbidities  Goal: Patient's chronic conditions and co-morbidity symptoms are monitored and maintained or improved  1/18/2023 0918 by Leanne Lee RN  Outcome: Progressing  1/17/2023 2224 by Mala Wayne RN  Outcome: Progressing  Flowsheets (Taken 1/17/2023 1152 by Leanne Lee RN)  Care Plan - Patient's Chronic Conditions and Co-Morbidity Symptoms are Monitored and Maintained or Improved:   Monitor and assess patient's chronic conditions and comorbid symptoms for stability, deterioration, or improvement   Collaborate with multidisciplinary team to address chronic and comorbid conditions and prevent exacerbation or deterioration   Update acute care plan with appropriate goals if chronic or comorbid symptoms are exacerbated and prevent overall improvement and discharge     Problem: Skin/Tissue Integrity  Goal: Absence of new skin breakdown  Description: 1. Monitor for areas of redness and/or skin breakdown  2. Assess vascular access sites hourly  3. Every 4-6 hours minimum:  Change oxygen saturation probe site  4. Every 4-6 hours:  If on nasal continuous positive airway pressure, respiratory therapy assess nares and determine need for appliance change or resting period.   1/18/2023 0918 by Tanvir Vega RN  Outcome: Progressing  1/17/2023 2224 by Roro Carver RN  Outcome: Progressing     Problem: Respiratory - Adult  Goal: Achieves optimal ventilation and oxygenation  1/18/2023 0918 by Tanvir Vega RN  Outcome: Progressing  1/17/2023 2224 by Roro Carver RN  Outcome: Progressing  Flowsheets (Taken 1/17/2023 0026 by Dwain Booth RN)  Achieves optimal ventilation and oxygenation:   Assess for changes in mentation and behavior   Assess for changes in respiratory status     Problem: Skin/Tissue Integrity - Adult  Goal: Skin integrity remains intact  1/18/2023 0918 by Tanvir Vega RN  Outcome: Progressing  1/17/2023 2224 by Roro Carver RN  Outcome: Progressing  Flowsheets (Taken 1/17/2023 2223)  Skin Integrity Remains Intact: Monitor for areas of redness and/or skin breakdown  Goal: Incisions, wounds, or drain sites healing without S/S of infection  1/18/2023 0918 by Tanvir Vega RN  Outcome: Progressing  1/17/2023 2224 by Roro Carver RN  Outcome: Progressing  Flowsheets (Taken 1/17/2023 2223)  Incisions, Wounds, or Drain Sites Healing Without Sign and Symptoms of Infection: TWICE DAILY: Assess and document skin integrity  Goal: Oral mucous membranes remain intact  1/18/2023 0918 by Tanvir Vega RN  Outcome: Progressing  1/17/2023 2224 by Roro Carver RN  Outcome: Progressing  Flowsheets (Taken 1/16/2023 1307 by Oliva Bowers RN)  Oral Mucous Membranes Remain Intact: Assess oral mucosa and hygiene practices     Problem: Metabolic/Fluid and Electrolytes - Adult  Goal: Electrolytes maintained within normal limits  1/18/2023 0918 by Roxana Williamson RN  Outcome: Progressing  1/17/2023 2224 by Alvina Melo RN  Outcome: Progressing  Flowsheets (Taken 1/17/2023 0026 by Abhishek Macario RN)  Electrolytes maintained within normal limits:   Administer electrolyte replacement as ordered   Monitor labs and assess patient for signs and symptoms of electrolyte imbalances  Goal: Hemodynamic stability and optimal renal function maintained  1/18/2023 0918 by Roxana Williamson RN  Outcome: Progressing  1/17/2023 2224 by Alvina Melo RN  Outcome: Progressing  Flowsheets (Taken 1/17/2023 0026 by Abhishek Macario RN)  Hemodynamic stability and optimal renal function maintained:   Monitor intake, output and patient weight   Monitor labs and assess for signs and symptoms of volume excess or deficit  Goal: Glucose maintained within prescribed range  1/18/2023 0918 by Roxana Williamson RN  Outcome: Progressing  1/17/2023 2224 by Alvina Melo RN  Outcome: Progressing  Flowsheets (Taken 1/17/2023 0026 by Abhishek Macario RN)  Glucose maintained within prescribed range:   Administer ordered medications to maintain glucose within target range   Assess for signs and symptoms of hyperglycemia and hypoglycemia   Monitor blood glucose as ordered     Problem: Nutrition Deficit:  Goal: Optimize nutritional status  1/18/2023 0918 by Roxana Williamson RN  Outcome: Progressing  1/17/2023 2224 by Alvina Melo RN  Outcome: Progressing  Flowsheets (Taken 1/17/2023 2224)  Nutrient intake appropriate for improving, restoring, or maintaining nutritional needs: Monitor oral intake, labs, and treatment plans

## 2023-01-18 NOTE — CARE COORDINATION
1/18/23, 1:22 PM EST    Patient goals/plan/ treatment preferences discussed by  and . Patient goals/plan/ treatment preferences reviewed with patient/ family. Patient/ family verbalize understanding of discharge plan and are in agreement with goal/plan/treatment preferences. Understanding was demonstrated using the teach back method. AVS provided by RN at time of discharge, which includes all necessary medical information pertaining to the patients current course of illness, treatment, post-discharge goals of care, and treatment preferences. Services At/After Discharge: [place] ([facility]), Aide services, In [facility], Nursing service, OT, and PT       IMM Letter  IMM Letter given to Patient/Family/Significant other/Guardian/POA/by[de-identified] Tre Gonzalez CM  IMM Letter date given[de-identified] 01/18/23  IMM Letter time given[de-identified] 2670     Pt is being discharged today to [facility]. Pt will be [facility] under his medicare. JEANNINE updated pt. JEANNINE notified daughter, Morgan Pinon via phone.   JEANNINE faxed AVS.  HEIDI for  at 3pm.  RN is aware

## 2023-01-18 NOTE — DISCHARGE SUMMARY
Hospital Medicine Discharge Summary      Patient Identification:   Pablo Espinal   : 1955  MRN: [id number]   Account: [de-identified]      Patient's PCP: No primary care provider on file. Admit Date: 2023     Discharge Date:   2023    Admitting Physician: Ignacio Faria MD     Discharge Physician: Veronique Arana APRN - CNP      Discharge Diagnoses:      Failure to Thrive in adult, improved. hyponatremia, resolved. Acute hypotension, resolved.   hypokalemia, resolved. Acute alcohol withdrawal due to chronic alcohol use,   Iron deficiency anemia,   Tobacco abuse, counseled. metabolic encephalopathy, resolved. Transaminasemia due to fatty liver disease and ETOH. The patient was seen and examined on day of discharge and this discharge summary is in conjunction with any daily progress note from day of discharge. Hospital Course:     Pablo Espinal is a 74 y.o. male admitted to [facility] on 2023 for failure to thrive, malnutrition, hyponatremia, metabolic acidosis, and alcohol abuse. Patient is a 74year old male who presented with complaints of fatigue and loss of appetite. Patients friends state patient has not eaten in a week, only a few bites daily and has not drank any alcohol in 3-4 days, normal alcoholic intake is 85+ beers daily. Requested to detox from ETOH. Patient only oriented to name and birthday and orientation has not changed throughout stay. Patient volume resuscitated with NS and improvement of hyponatremia and hypotension noted with resolution of metabolic acidosis. Midodrine was given x 1 dose. Will not resume on discharge to ECF with current BP trend. Potassium and magnesium replacement given. Ferrous Sulfate started for iron deficiency anemia. Phenobarbital prophylaxis given for alcohol withdraw along with IV Thiamine. No significant adverse events during detox stage. He was maintained on seizure and fall precautions.  Addiction services and SW seen, resources given. To ECF on Iron, Multivitamin, folic acid and oral thiamine. PT/OT and dietician to follow at facility. Repeat labs in 1 week. Exam:     Vitals:  Vitals:    01/17/23 2253 01/18/23 0333 01/18/23 0829 01/18/23 1125   BP: (!) 145/80 (!) 140/80 96/71 (!) 116/104   Pulse: 81 80 85 (!) 103   Resp: 18 18 16 18   Temp: 97.9 °F (36.6 °C) 97.8 °F (36.6 °C) 98.2 °F (36.8 °C) 97.5 °F (36.4 °C)   TempSrc: Oral Oral Oral Oral   SpO2: 100% 100% 100% 99%   Weight:       Height:         Weight: Weight: 106 lb 14.4 oz (48.5 kg)     24 hour intake/output:  Intake/Output Summary (Last 24 hours) at 1/18/2023 1259  Last data filed at 1/18/2023 0919  Gross per 24 hour   Intake 510 ml   Output --   Net 510 ml         General appearance:  No apparent distress, appears stated age and cooperative. HEENT:  Normal cephalic, atraumatic without obvious deformity. Pupils equal, round, and reactive to light. Extra ocular muscles intact. Conjunctivae/corneas clear. Neck: Supple, with full range of motion. No jugular venous distention. Trachea midline. Respiratory:  Normal respiratory effort. Clear to auscultation, diminished and bilaterally without Rales/Wheezes/Rhonchi. Pt with congestive moist cough. Cardiovascular:  Regular rate and rhythm with normal S1/S2 without murmurs, rubs or gallops. Abdomen: Soft, non-tender, non-distended with normal bowel sounds. Musculoskeletal:  No clubbing, cyanosis or edema bilaterally. Full range of motion without deformity. Slight tremor noted upon touch. Skin: Skin color pale, texture, turgor normal.  Multiple scratches noted and scattered bruising. Neurologic:  Neurovascularly intact without any focal sensory/motor deficits. Psychiatric:  Alert and oriented to person only, thought processes not complete, lacks insight  Capillary Refill: Brisk,< 3 seconds   Peripheral Pulses: +2 palpable, equal bilaterally       Labs:  For convenience and continuity at follow-up the following most recent labs are provided:      CBC:    Lab Results   Component Value Date/Time    WBC 6.5 01/15/2023 07:04 AM    HGB 10.5 01/15/2023 07:04 AM    HCT 30.9 01/15/2023 07:04 AM     01/15/2023 07:04 AM       Renal:    Lab Results   Component Value Date/Time     01/18/2023 05:28 AM    K 3.5 01/18/2023 05:28 AM     01/18/2023 05:28 AM    CO2 24 01/18/2023 05:28 AM    BUN 7 01/18/2023 05:28 AM    CREATININE 0.7 01/18/2023 05:28 AM    CALCIUM 8.1 01/18/2023 05:28 AM         Significant Diagnostic Studies    Radiology:   US ABDOMEN COMPLETE   Final Result       1. Echogenic liver suggesting fatty infiltration. More advanced forms of liver disease are not excluded. 2. No hydronephrosis of either kidney. 3. Normal-appearing gallbladder. **This report has been created using voice recognition software. It may contain minor errors which are inherent in voice recognition technology. **      Final report electronically signed by Dr. Jose Ward on 1/16/2023 7:20 AM      XR CHEST PORTABLE   Final Result   1. Emphysematous changes predominantly within the upper lung zones are seen. No other acute cardiopulmonary findings are otherwise demonstrated. **This report has been created using voice recognition software. It may contain minor errors which are inherent in voice recognition technology. **      Final report electronically signed by Dr. Anusha Crawley on 1/14/2023 4:00 PM             Consults:     IP CONSULT TO DIETITIAN  IP CONSULT TO ADDICTION SERVICES  IP CONSULT TO SOCIAL WORK  IP CONSULT TO SOCIAL WORK    Disposition:    [] Home       [] TCU       [] Rehab       [] Psych       [x] SNF       [] [place]       [] Other-    Condition at Discharge: Stable    Code Status:  Full Code     Patient Instructions:    Discharge lab work: BMP, Mg  Activity: activity as tolerated  Diet: ADULT DIET;  Regular  ADULT ORAL NUTRITION SUPPLEMENT; Breakfast, Dinner; Standard High Calorie/High Protein Oral Supplement      Follow-up visits:   Shelley Rodriguez  [phone]             Discharge Medications:        Medication List        START taking these medications      ferrous sulfate 325 (65 Fe) MG tablet  Commonly known as: IRON 325  Take 1 tablet by mouth daily (with breakfast)  Start taking on: January 19, 4672     folic acid 1 MG tablet  Commonly known as: FOLVITE  Take 1 tablet by mouth daily     melatonin 3 MG Tabs tablet  Take 1 tablet by mouth nightly as needed (Sleep)     multivitamin with minerals tablet  Take 1 tablet by mouth daily     nicotine 21 MG/24HR  Commonly known as: NICODERM CQ  Place 1 patch onto the skin daily  Start taking on: January 19, 2023     polyethylene glycol 17 g packet  Commonly known as: GLYCOLAX  Take 17 g by mouth daily as needed for Constipation     vitamin B-1 100 MG tablet  Commonly known as: THIAMINE  Take 1 tablet by mouth daily               Where to Get Your Medications        Information about where to get these medications is not yet available    Ask your nurse or doctor about these medications  ferrous sulfate 325 (65 Fe) MG tablet  folic acid 1 MG tablet  melatonin 3 MG Tabs tablet  multivitamin with minerals tablet  nicotine 21 MG/24HR  polyethylene glycol 17 g packet  vitamin B-1 100 MG tablet         Time Spent on discharge is more than 1 hour in the examination, evaluation, counseling and review of medications and discharge plan. Signed: Thank you No primary care provider on file. for the opportunity to be involved in this patient's care.     Electronically signed by Norma Ku on 1/18/2023 at 12:59 PM

## 2023-01-18 NOTE — PLAN OF CARE
Problem: Discharge Planning  Goal: Discharge to home or other facility with appropriate resources  1/17/2023 2224 by Myrna Leyva RN  Outcome: Progressing  Flowsheets (Taken 1/17/2023 1152 by German Cohen RN)  Discharge to home or other facility with appropriate resources: Identify barriers to discharge with patient and caregiver  1/17/2023 1152 by German Cohen RN  Outcome: Progressing  Flowsheets (Taken 1/17/2023 1152)  Discharge to home or other facility with appropriate resources: Identify barriers to discharge with patient and caregiver     Problem: Safety - Adult  Goal: Free from fall injury  1/17/2023 2224 by Myrna Leyva RN  Outcome: Progressing  Flowsheets (Taken 1/17/2023 1152 by German Cohen RN)  Free From Fall Injury: Instruct family/caregiver on patient safety  1/17/2023 1152 by German Cohen RN  Outcome: Progressing  Flowsheets (Taken 1/17/2023 1152)  Free From Fall Injury: Instruct family/caregiver on patient safety     Problem: ABCDS Injury Assessment  Goal: Absence of physical injury  1/17/2023 2224 by Myrna Leyva RN  Outcome: Progressing  Flowsheets (Taken 1/17/2023 0026 by Bijan Villegas RN)  Absence of Physical Injury: Implement safety measures based on patient assessment  1/17/2023 1152 by German Cohen RN  Outcome: Progressing     Problem: Chronic Conditions and Co-morbidities  Goal: Patient's chronic conditions and co-morbidity symptoms are monitored and maintained or improved  1/17/2023 2224 by Myrna Leyva RN  Outcome: Progressing  Flowsheets (Taken 1/17/2023 1152 by German Cohen RN)  Care Plan - Patient's Chronic Conditions and Co-Morbidity Symptoms are Monitored and Maintained or Improved:   Monitor and assess patient's chronic conditions and comorbid symptoms for stability, deterioration, or improvement   Collaborate with multidisciplinary team to address chronic and comorbid conditions and prevent exacerbation or deterioration   Update acute care plan with appropriate goals if chronic or comorbid symptoms are exacerbated and prevent overall improvement and discharge  1/17/2023 1152 by Norman Varner RN  Outcome: Progressing  Flowsheets (Taken 1/17/2023 1152)  Care Plan - Patient's Chronic Conditions and Co-Morbidity Symptoms are Monitored and Maintained or Improved:   Monitor and assess patient's chronic conditions and comorbid symptoms for stability, deterioration, or improvement   Collaborate with multidisciplinary team to address chronic and comorbid conditions and prevent exacerbation or deterioration   Update acute care plan with appropriate goals if chronic or comorbid symptoms are exacerbated and prevent overall improvement and discharge     Problem: Skin/Tissue Integrity  Goal: Absence of new skin breakdown  Description: 1. Monitor for areas of redness and/or skin breakdown  2. Assess vascular access sites hourly  3. Every 4-6 hours minimum:  Change oxygen saturation probe site  4. Every 4-6 hours:  If on nasal continuous positive airway pressure, respiratory therapy assess nares and determine need for appliance change or resting period.   1/17/2023 2224 by Albina Varner RN  Outcome: Progressing  1/17/2023 1152 by Norman Varner RN  Outcome: Progressing     Problem: Respiratory - Adult  Goal: Achieves optimal ventilation and oxygenation  1/17/2023 2224 by Albina Varner RN  Outcome: Progressing  Flowsheets (Taken 1/17/2023 0026 by Yobany Morrell RN)  Achieves optimal ventilation and oxygenation:   Assess for changes in mentation and behavior   Assess for changes in respiratory status  1/17/2023 1152 by Norman Varner RN  Outcome: Progressing     Problem: Skin/Tissue Integrity - Adult  Goal: Skin integrity remains intact  1/17/2023 2224 by Albina Varner RN  Outcome: Progressing  Flowsheets (Taken 1/17/2023 2223)  Skin Integrity Remains Intact: Monitor for areas of redness and/or skin breakdown  1/17/2023 1152 by Norman Varner RN  Outcome: Progressing  Goal: Incisions, wounds, or drain sites healing without S/S of infection  1/17/2023 2224 by Celso Head RN  Outcome: Progressing  Flowsheets (Taken 1/17/2023 2223)  Incisions, Wounds, or Drain Sites Healing Without Sign and Symptoms of Infection: TWICE DAILY: Assess and document skin integrity  1/17/2023 1152 by Jordan Fioer RN  Outcome: Progressing  Goal: Oral mucous membranes remain intact  1/17/2023 2224 by Celso Head RN  Outcome: Progressing  Flowsheets (Taken 1/16/2023 1307 by Javier Davidson RN)  Oral Mucous Membranes Remain Intact: Assess oral mucosa and hygiene practices  1/17/2023 1152 by Jordan Fiore RN  Outcome: Progressing     Problem: Metabolic/Fluid and Electrolytes - Adult  Goal: Electrolytes maintained within normal limits  1/17/2023 2224 by Celso Head RN  Outcome: Progressing  Flowsheets (Taken 1/17/2023 0026 by Tevin Yoo RN)  Electrolytes maintained within normal limits:   Administer electrolyte replacement as ordered   Monitor labs and assess patient for signs and symptoms of electrolyte imbalances  1/17/2023 1152 by Jordan Fiore RN  Outcome: Progressing  Goal: Hemodynamic stability and optimal renal function maintained  1/17/2023 2224 by Celso Head RN  Outcome: Progressing  Flowsheets (Taken 1/17/2023 0026 by Tevin Yoo RN)  Hemodynamic stability and optimal renal function maintained:   Monitor intake, output and patient weight   Monitor labs and assess for signs and symptoms of volume excess or deficit  1/17/2023 1152 by Jordan Fiore RN  Outcome: Progressing  Goal: Glucose maintained within prescribed range  1/17/2023 2224 by Celso Head RN  Outcome: Progressing  Flowsheets (Taken 1/17/2023 0026 by Tevin Yoo RN)  Glucose maintained within prescribed range:   Administer ordered medications to maintain glucose within target range   Assess for signs and symptoms of hyperglycemia and hypoglycemia   Monitor blood glucose as ordered  1/17/2023 1152 by Jordan Fiore RN  Outcome: Progressing     Problem: Nutrition Deficit:  Goal: Optimize nutritional status  1/17/2023 2224 by Nilsa Sales RN  Outcome: Progressing  Flowsheets (Taken 1/17/2023 2224)  Nutrient intake appropriate for improving, restoring, or maintaining nutritional needs: Monitor oral intake, labs, and treatment plans  1/17/2023 1152 by Corinne Belts, RN  Outcome: Progressing   Care plan reviewed with patient. Patient verbalizes understanding of the plan of care and contribute to goal setting.

## 2023-01-19 LAB
BLOOD CULTURE, ROUTINE: NORMAL
BLOOD CULTURE, ROUTINE: NORMAL

## 2023-01-21 LAB — VIT B1 PYROPHOSHATE BLD-SCNC: 68 NMOL/L (ref 70–180)
